# Patient Record
Sex: MALE | Race: WHITE | Employment: STUDENT | URBAN - METROPOLITAN AREA
[De-identification: names, ages, dates, MRNs, and addresses within clinical notes are randomized per-mention and may not be internally consistent; named-entity substitution may affect disease eponyms.]

---

## 2022-04-03 ENCOUNTER — HOSPITAL ENCOUNTER (INPATIENT)
Age: 22
LOS: 8 days | Discharge: HOME | DRG: 885 | End: 2022-04-12
Attending: PSYCHIATRY & NEUROLOGY | Admitting: PSYCHIATRY & NEUROLOGY
Payer: COMMERCIAL

## 2022-04-03 DIAGNOSIS — F29 PSYCHOSIS, UNSPECIFIED PSYCHOSIS TYPE (HCC): Primary | ICD-10-CM

## 2022-04-03 LAB
ACETAMINOPHEN LEVEL: <5 UG/ML (ref 10–30)
ALBUMIN SERPL-MCNC: 5.1 G/DL (ref 3.5–4.6)
ALP BLD-CCNC: 90 U/L (ref 35–104)
ALT SERPL-CCNC: 18 U/L (ref 0–41)
AMPHETAMINE SCREEN, URINE: NORMAL
ANION GAP SERPL CALCULATED.3IONS-SCNC: 17 MEQ/L (ref 9–15)
AST SERPL-CCNC: 35 U/L (ref 0–40)
BARBITURATE SCREEN URINE: NORMAL
BASOPHILS ABSOLUTE: 0.1 K/UL (ref 0–0.2)
BASOPHILS RELATIVE PERCENT: 0.9 %
BENZODIAZEPINE SCREEN, URINE: NORMAL
BILIRUB SERPL-MCNC: 0.7 MG/DL (ref 0.2–0.7)
BILIRUBIN URINE: NEGATIVE
BLOOD, URINE: NEGATIVE
BUN BLDV-MCNC: 4 MG/DL (ref 6–20)
CALCIUM SERPL-MCNC: 10.5 MG/DL (ref 8.5–9.9)
CANNABINOID SCREEN URINE: NORMAL
CHLORIDE BLD-SCNC: 91 MEQ/L (ref 95–107)
CHOLESTEROL, TOTAL: 182 MG/DL (ref 0–199)
CLARITY: CLEAR
CO2: 27 MEQ/L (ref 20–31)
COCAINE METABOLITE SCREEN URINE: NORMAL
COLOR: YELLOW
CREAT SERPL-MCNC: 0.71 MG/DL (ref 0.7–1.2)
EOSINOPHILS ABSOLUTE: 0 K/UL (ref 0–0.7)
EOSINOPHILS RELATIVE PERCENT: 0.4 %
ETHANOL PERCENT: NORMAL G/DL
ETHANOL: <10 MG/DL (ref 0–0.08)
GFR AFRICAN AMERICAN: >60
GFR NON-AFRICAN AMERICAN: >60
GLOBULIN: 2.6 G/DL (ref 2.3–3.5)
GLUCOSE BLD-MCNC: 95 MG/DL (ref 70–99)
GLUCOSE URINE: NEGATIVE MG/DL
HCT VFR BLD CALC: 45.2 % (ref 42–52)
HDLC SERPL-MCNC: 104 MG/DL (ref 40–59)
HEMOGLOBIN: 15.8 G/DL (ref 14–18)
KETONES, URINE: NEGATIVE MG/DL
LDL CHOLESTEROL CALCULATED: 65 MG/DL (ref 0–129)
LEUKOCYTE ESTERASE, URINE: NEGATIVE
LYMPHOCYTES ABSOLUTE: 0.7 K/UL (ref 1–4.8)
LYMPHOCYTES RELATIVE PERCENT: 12.5 %
Lab: NORMAL
MCH RBC QN AUTO: 29.1 PG (ref 27–31.3)
MCHC RBC AUTO-ENTMCNC: 34.9 % (ref 33–37)
MCV RBC AUTO: 83.2 FL (ref 80–100)
METHADONE SCREEN, URINE: NORMAL
MONOCYTES ABSOLUTE: 0.6 K/UL (ref 0.2–0.8)
MONOCYTES RELATIVE PERCENT: 11.4 %
NEUTROPHILS ABSOLUTE: 4.1 K/UL (ref 1.4–6.5)
NEUTROPHILS RELATIVE PERCENT: 74.8 %
NITRITE, URINE: NEGATIVE
OPIATE SCREEN URINE: NORMAL
OXYCODONE URINE: NORMAL
PDW BLD-RTO: 14 % (ref 11.5–14.5)
PH UA: 6.5 (ref 5–9)
PHENCYCLIDINE SCREEN URINE: NORMAL
PLATELET # BLD: 318 K/UL (ref 130–400)
POTASSIUM SERPL-SCNC: 3.2 MEQ/L (ref 3.4–4.9)
PROPOXYPHENE SCREEN: NORMAL
PROTEIN UA: NEGATIVE MG/DL
RBC # BLD: 5.43 M/UL (ref 4.7–6.1)
SALICYLATE, SERUM: <0.3 MG/DL (ref 15–30)
SARS-COV-2, NAAT: NOT DETECTED
SODIUM BLD-SCNC: 135 MEQ/L (ref 135–144)
SPECIFIC GRAVITY UA: 1 (ref 1–1.03)
TOTAL CK: 342 U/L (ref 0–190)
TOTAL PROTEIN: 7.7 G/DL (ref 6.3–8)
TRIGL SERPL-MCNC: 63 MG/DL (ref 0–150)
TSH SERPL DL<=0.05 MIU/L-ACNC: 2.59 UIU/ML (ref 0.44–3.86)
URINE REFLEX TO CULTURE: NORMAL
UROBILINOGEN, URINE: 0.2 E.U./DL
WBC # BLD: 5.5 K/UL (ref 4.8–10.8)

## 2022-04-03 PROCEDURE — 82077 ASSAY SPEC XCP UR&BREATH IA: CPT

## 2022-04-03 PROCEDURE — 96372 THER/PROPH/DIAG INJ SC/IM: CPT

## 2022-04-03 PROCEDURE — 36415 COLL VENOUS BLD VENIPUNCTURE: CPT

## 2022-04-03 PROCEDURE — 82550 ASSAY OF CK (CPK): CPT

## 2022-04-03 PROCEDURE — 80143 DRUG ASSAY ACETAMINOPHEN: CPT

## 2022-04-03 PROCEDURE — 6360000002 HC RX W HCPCS: Performed by: FAMILY MEDICINE

## 2022-04-03 PROCEDURE — 84443 ASSAY THYROID STIM HORMONE: CPT

## 2022-04-03 PROCEDURE — 80061 LIPID PANEL: CPT

## 2022-04-03 PROCEDURE — 6360000002 HC RX W HCPCS: Performed by: PHYSICIAN ASSISTANT

## 2022-04-03 PROCEDURE — 80053 COMPREHEN METABOLIC PANEL: CPT

## 2022-04-03 PROCEDURE — 80307 DRUG TEST PRSMV CHEM ANLYZR: CPT

## 2022-04-03 PROCEDURE — 85025 COMPLETE CBC W/AUTO DIFF WBC: CPT

## 2022-04-03 PROCEDURE — 99285 EMERGENCY DEPT VISIT HI MDM: CPT

## 2022-04-03 PROCEDURE — 81003 URINALYSIS AUTO W/O SCOPE: CPT

## 2022-04-03 PROCEDURE — 87635 SARS-COV-2 COVID-19 AMP PRB: CPT

## 2022-04-03 PROCEDURE — 2580000003 HC RX 258

## 2022-04-03 PROCEDURE — 80179 DRUG ASSAY SALICYLATE: CPT

## 2022-04-03 RX ORDER — LORAZEPAM 2 MG/ML
1 INJECTION INTRAMUSCULAR ONCE
Status: COMPLETED | OUTPATIENT
Start: 2022-04-03 | End: 2022-04-03

## 2022-04-03 RX ORDER — DIPHENHYDRAMINE HYDROCHLORIDE 50 MG/ML
50 INJECTION INTRAMUSCULAR; INTRAVENOUS ONCE
Status: COMPLETED | OUTPATIENT
Start: 2022-04-03 | End: 2022-04-03

## 2022-04-03 RX ORDER — ZIPRASIDONE MESYLATE 20 MG/ML
20 INJECTION, POWDER, LYOPHILIZED, FOR SOLUTION INTRAMUSCULAR ONCE
Status: COMPLETED | OUTPATIENT
Start: 2022-04-03 | End: 2022-04-03

## 2022-04-03 RX ADMIN — LORAZEPAM 1 MG: 2 INJECTION INTRAMUSCULAR; INTRAVENOUS at 19:17

## 2022-04-03 RX ADMIN — WATER 1.2 ML: 1 INJECTION INTRAMUSCULAR; INTRAVENOUS; SUBCUTANEOUS at 22:26

## 2022-04-03 RX ADMIN — ZIPRASIDONE MESYLATE 20 MG: 20 INJECTION, POWDER, LYOPHILIZED, FOR SOLUTION INTRAMUSCULAR at 22:24

## 2022-04-03 RX ADMIN — DIPHENHYDRAMINE HYDROCHLORIDE 50 MG: 50 INJECTION, SOLUTION INTRAMUSCULAR; INTRAVENOUS at 22:24

## 2022-04-03 NOTE — ED NOTES
Talked with  at [facility], talked with Patsy Ambrocio the officer who states he can only give me limited information but will have Betty Minors call the [phone] or [phone] number back with information regarding the pt, how long he has been having a hard time, his fathers name and phone number which he states he can not give me this information.   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

## 2022-04-03 NOTE — ED NOTES
Talked with [place] who called the -ER, a supervisor  He was going on an outing with other students and a student had concerns on the pt. The Protocol Counselor saw the pt and felt he needed to be seen at the hospital.  He was jumping off of bikes, pacing, and not able to answer any questions. Pt's father's name is Traci Lee at [phone].   The [facility] believes his erratic behavior started maybe Friday night or Saturday morning but not sure     Ancel Runner, RN  04/03/22 5011

## 2022-04-03 NOTE — ED PROVIDER NOTES
[address] [facility] ED  EMERGENCY DEPARTMENT ENCOUNTER      Pt Name: Kami Das  MRN: [id number]  [place] 2000  Date of evaluation: 4/3/2022  Provider: Amada Zamarripa DO    CHIEF COMPLAINT     No chief complaint on file. HISTORY OF PRESENT ILLNESS   (Location/Symptom, Timing/Onset, Context/Setting, Quality, Duration, Modifying Factors, Severity)  Note limiting factors. Kami Das is a 25 y.o. male who presents to the emergency department from [facility]. Patient states that he is here because \"they called\" though he cannot tell me who \"they\" are. Patient appears disoriented though he does know that he is at the hospital, his full name, date of birth, age but is impaired to situational awareness. Given this, he is unable to provide any further insight into the nature of his illness but he does deny any suicidal or homicidal ideation, auditory visual hallucinations    HPI    Nursing Notes were reviewed. REVIEW OF SYSTEMS    (2-9 systems for level 4, 10 or more for level 5)     Review of Systems   Unable to perform ROS: Psychiatric disorder       Except as noted above the remainder of the review of systems was reviewed and negative. PAST MEDICAL HISTORY   No past medical history on file. SURGICAL HISTORY     No past surgical history on file. CURRENT MEDICATIONS       Previous Medications    No medications on file       ALLERGIES     Patient has no known allergies. FAMILY HISTORY     No family history on file.        SOCIAL HISTORY       Social History     Socioeconomic History    Marital status: Not on file     Spouse name: Not on file    Number of children: Not on file    Years of education: Not on file    Highest education level: Not on file   Occupational History    Not on file   Tobacco Use    Smoking status: Not on file    Smokeless tobacco: Not on file   Substance and Sexual Activity    Alcohol use: Not on file    Drug use: Not on file    Sexual activity: Not on file   Other Topics Concern    Not on file   Social History Narrative    Not on file     Social Determinants of Health     Financial Resource Strain:     Difficulty of Paying Living Expenses: Not on file   Food Insecurity:     Worried About Running Out of Food in the Last Year: Not on file    Audrey of Food in the Last Year: Not on file   Transportation Needs:     Lack of Transportation (Medical): Not on file    Lack of Transportation (Non-Medical): Not on file   Physical Activity:     Days of Exercise per Week: Not on file    Minutes of Exercise per Session: Not on file   Stress:     Feeling of Stress : Not on file   Social Connections:     Frequency of Communication with Friends and Family: Not on file    Frequency of Social Gatherings with Friends and Family: Not on file    Attends Rastafarian Services: Not on file    Active Member of [address] [place] [address] [employer] or Organizations: Not on file    Attends Club or Organization Meetings: Not on file    Marital Status: Not on file   Intimate Partner Violence:     Fear of Current or Ex-Partner: Not on file    Emotionally Abused: Not on file    Physically Abused: Not on file    Sexually Abused: Not on file   Housing Stability:     Unable to Pay for Housing in the Last Year: Not on file    Number of [place] in the Last Year: Not on file    Unstable Housing in the Last Year: Not on file       SCREENINGS                        PHYSICAL EXAM    (up to 7 for level 4, 8 or more for level 5)     ED Triage Vitals   BP Temp Temp src Pulse Resp SpO2 Height Weight   -- -- -- -- -- -- -- --       Physical Exam  Vitals and nursing note reviewed. Constitutional:       General: He is not in acute distress. Appearance: He is well-developed. He is not diaphoretic. HENT:      Head: Normocephalic and atraumatic. Mouth/Throat:      Pharynx: No oropharyngeal exudate. Eyes:      General: No scleral icterus.      Conjunctiva/sclera: Conjunctivae normal. Pupils: Pupils are equal, round, and reactive to light. Neck:      Trachea: No tracheal deviation. Cardiovascular:      Rate and Rhythm: Normal rate. Heart sounds: Normal heart sounds. Pulmonary:      Effort: Pulmonary effort is normal. No respiratory distress. Breath sounds: Normal breath sounds. Abdominal:      General: Bowel sounds are normal. There is no distension. Palpations: Abdomen is soft. Musculoskeletal:         General: Normal range of motion. Cervical back: Normal range of motion and neck supple. Skin:     General: Skin is warm and dry. Findings: No erythema or rash. Neurological:      Mental Status: He is alert. He is disoriented. Cranial Nerves: No cranial nerve deficit. Motor: No abnormal muscle tone. Psychiatric:         Attention and Perception: He is inattentive. Speech: Speech is delayed. Cognition and Memory: Memory is impaired. DIAGNOSTIC RESULTS     EKG: All EKG's are interpreted by the Emergency Department Physician who either signs or Co-signs this chart in the absence of a cardiologist.      RADIOLOGY:   Non-plain film images such as CT, Ultrasound and MRI are read by the radiologist. Plain radiographic images are visualized and preliminarily interpreted by the emergency physician with the below findings:        Interpretation per the Radiologist below, if available at the time of this note:    No orders to display         ED BEDSIDE ULTRASOUND:   Performed by ED Physician - none    LABS:  Labs Reviewed   ACETAMINOPHEN LEVEL - Abnormal; Notable for the following components:       Result Value    Acetaminophen Level <5 (*)     All other components within normal limits   CBC WITH AUTO DIFFERENTIAL - Abnormal; Notable for the following components:    Lymphocytes Absolute 0.7 (*)     All other components within normal limits   CK - Abnormal; Notable for the following components:     Total  (*)     All other components within normal limits   COMPREHENSIVE METABOLIC PANEL - Abnormal; Notable for the following components:    Potassium 3.2 (*)     Chloride 91 (*)     Anion Gap 17 (*)     BUN 4 (*)     Calcium 10.5 (*)     Albumin 5.1 (*)     All other components within normal limits   LIPID PANEL - Abnormal; Notable for the following components:     (*)     All other components within normal limits   SALICYLATE LEVEL - Abnormal; Notable for the following components:    Salicylate, Serum <0.8 (*)     All other components within normal limits   COVID-19, RAPID   ETHANOL   TSH   URINE DRUG SCREEN   URINALYSIS WITH REFLEX TO CULTURE       All other labs were within normal range or not returned as of this dictation. EMERGENCY DEPARTMENT COURSE and DIFFERENTIAL DIAGNOSIS/MDM:   Vitals:    Vitals:    04/03/22 1630 04/03/22 2215   BP: 127/89 113/80   Pulse: 85 85   Resp: 18 18   Temp: 98.9 °F (37.2 °C)    TempSrc: Oral    SpO2: 98% 95%       Medically clear for BH. MDM      REASSESSMENT      Patient is medically cleared for psychiatric evaluation and care    CONSULTS:  None    PROCEDURES:  Unless otherwise noted below, none     Procedures    \    FINAL IMPRESSION      1. Psychosis, unspecified psychosis type ([facility] [address]          DISPOSITION/PLAN   DISPOSITION  3 [facility]      PATIENT REFERRED TO:  No follow-up provider specified. DISCHARGE MEDICATIONS:  New Prescriptions    No medications on file     Controlled Substances Monitoring:     No flowsheet data found.     (Please note that portions of this note were completed with a voice recognition program.  Efforts were made to edit the dictations but occasionally words are mis-transcribed.)    Annette Lea DO (electronically signed)  Attending Emergency Physician            Annette Lea DO  04/04/22 4935

## 2022-04-03 NOTE — ED NOTES
Pt came in directly from [facility] came back as triage was busy. Pt is almost non verbal and wants to leave. Called security and [employer] is here assessing the pt for a medical assessment. Pt is disorganized, confused, delayed answering, thought blocking. Security was here with the pt. Called [facility] and they did not bring the pt here to the [facility] and he will have the squad call to the ER, [phone] number given.      Eden Escobedo RN  04/03/22 4845

## 2022-04-03 NOTE — ED NOTES
Talked with [facility] and they state pt was picked up at the college after his father talked with the college and he was not doing well is not his normal self. No name or number for the father. Asked if they had the number for the campus police. Zoloft was on his counter in his room not brought with him no other information was known by the [employer].      Lisa Green RN  04/03/22 9920

## 2022-04-04 PROBLEM — F29 PSYCHOSIS (HCC): Status: ACTIVE | Noted: 2022-04-04

## 2022-04-04 PROCEDURE — 2580000003 HC RX 258

## 2022-04-04 PROCEDURE — 2500000003 HC RX 250 WO HCPCS: Performed by: PHYSICIAN ASSISTANT

## 2022-04-04 PROCEDURE — 1240000000 HC EMOTIONAL WELLNESS R&B

## 2022-04-04 PROCEDURE — 99223 1ST HOSP IP/OBS HIGH 75: CPT | Performed by: PSYCHIATRY & NEUROLOGY

## 2022-04-04 PROCEDURE — 6370000000 HC RX 637 (ALT 250 FOR IP): Performed by: PSYCHIATRY & NEUROLOGY

## 2022-04-04 PROCEDURE — 96372 THER/PROPH/DIAG INJ SC/IM: CPT

## 2022-04-04 RX ORDER — CLONAZEPAM 0.5 MG/1
0.25 TABLET ORAL 2 TIMES DAILY PRN
Status: ON HOLD | COMMUNITY
End: 2022-04-12 | Stop reason: HOSPADM

## 2022-04-04 RX ORDER — VALPROIC ACID 250 MG/5ML
500 SOLUTION ORAL 2 TIMES DAILY
Status: DISCONTINUED | OUTPATIENT
Start: 2022-04-04 | End: 2022-04-12 | Stop reason: HOSPADM

## 2022-04-04 RX ORDER — HYDROXYZINE HYDROCHLORIDE 50 MG/ML
50 INJECTION, SOLUTION INTRAMUSCULAR EVERY 6 HOURS PRN
Status: DISCONTINUED | OUTPATIENT
Start: 2022-04-04 | End: 2022-04-12 | Stop reason: HOSPADM

## 2022-04-04 RX ORDER — LORAZEPAM 2 MG/ML
2 INJECTION INTRAMUSCULAR ONCE
Status: DISCONTINUED | OUTPATIENT
Start: 2022-04-04 | End: 2022-04-04

## 2022-04-04 RX ORDER — OLANZAPINE 10 MG/1
10 INJECTION, POWDER, LYOPHILIZED, FOR SOLUTION INTRAMUSCULAR
Status: COMPLETED | OUTPATIENT
Start: 2022-04-04 | End: 2022-04-04

## 2022-04-04 RX ORDER — PALIPERIDONE 3 MG/1
3 TABLET, EXTENDED RELEASE ORAL DAILY
Status: DISCONTINUED | OUTPATIENT
Start: 2022-04-04 | End: 2022-04-05

## 2022-04-04 RX ORDER — HYDROXYZINE PAMOATE 50 MG/1
50 CAPSULE ORAL EVERY 6 HOURS PRN
Status: DISCONTINUED | OUTPATIENT
Start: 2022-04-04 | End: 2022-04-12 | Stop reason: HOSPADM

## 2022-04-04 RX ORDER — ACETAMINOPHEN 325 MG/1
650 TABLET ORAL EVERY 4 HOURS PRN
Status: DISCONTINUED | OUTPATIENT
Start: 2022-04-04 | End: 2022-04-12 | Stop reason: HOSPADM

## 2022-04-04 RX ORDER — HALOPERIDOL 5 MG
5 TABLET ORAL EVERY 6 HOURS PRN
Status: DISCONTINUED | OUTPATIENT
Start: 2022-04-04 | End: 2022-04-12 | Stop reason: HOSPADM

## 2022-04-04 RX ORDER — TRAZODONE HYDROCHLORIDE 50 MG/1
50 TABLET ORAL NIGHTLY PRN
Status: DISCONTINUED | OUTPATIENT
Start: 2022-04-04 | End: 2022-04-12 | Stop reason: HOSPADM

## 2022-04-04 RX ORDER — BENZTROPINE MESYLATE 1 MG/ML
2 INJECTION INTRAMUSCULAR; INTRAVENOUS 2 TIMES DAILY PRN
Status: DISCONTINUED | OUTPATIENT
Start: 2022-04-04 | End: 2022-04-12 | Stop reason: HOSPADM

## 2022-04-04 RX ORDER — HALOPERIDOL 5 MG/ML
5 INJECTION INTRAMUSCULAR EVERY 6 HOURS PRN
Status: DISCONTINUED | OUTPATIENT
Start: 2022-04-04 | End: 2022-04-12 | Stop reason: HOSPADM

## 2022-04-04 RX ADMIN — PALIPERIDONE 3 MG: 3 TABLET, EXTENDED RELEASE ORAL at 17:37

## 2022-04-04 RX ADMIN — HALOPERIDOL 5 MG: 5 TABLET ORAL at 23:45

## 2022-04-04 RX ADMIN — HYDROXYZINE PAMOATE 50 MG: 50 CAPSULE ORAL at 12:30

## 2022-04-04 RX ADMIN — VALPROIC ACID 500 MG: 250 SOLUTION ORAL at 17:37

## 2022-04-04 RX ADMIN — HYDROXYZINE PAMOATE 50 MG: 50 CAPSULE ORAL at 23:45

## 2022-04-04 RX ADMIN — OLANZAPINE 10 MG: 10 INJECTION, POWDER, FOR SOLUTION INTRAMUSCULAR at 08:24

## 2022-04-04 RX ADMIN — TRAZODONE HYDROCHLORIDE 50 MG: 50 TABLET ORAL at 21:39

## 2022-04-04 RX ADMIN — WATER 2.1 ML: 1 INJECTION INTRAMUSCULAR; INTRAVENOUS; SUBCUTANEOUS at 08:24

## 2022-04-04 ASSESSMENT — SLEEP AND FATIGUE QUESTIONNAIRES
DIFFICULTY ARISING: NO
DIFFICULTY FALLING ASLEEP: NO
AVERAGE NUMBER OF SLEEP HOURS: 2
DIFFICULTY STAYING ASLEEP: YES
DO YOU USE A SLEEP AID: NO
SLEEP PATTERN: DIFFICULTY FALLING ASLEEP;DISTURBED/INTERRUPTED SLEEP;INSOMNIA
RESTFUL SLEEP: NO
DO YOU HAVE DIFFICULTY SLEEPING: YES

## 2022-04-04 NOTE — ED NOTES
Per Dr Mini Gaines, admit to 3w with 1:1. Psychosis NOS, 3w PRNS. 3w informed, staffing called and notified of need for 1:1, and Dr Antwan Rodrigez given dispo.      Eliezer Roberto RN  04/04/22 5819

## 2022-04-04 NOTE — PROGRESS NOTES
Report received from [facility]  Patient presents to the ED from [facility]. Upon arrival patient was non-verbal. Patient then talked in triage but was disorganized, confused, delayed and thought blocking. Patient was pleasant during assessment but unable to answer questions appropriately when asked. Patient talks in circles and does not answer asked questions. Patient is discharge focused, wanting to leave by 8am for a program at [facility]. Patient is Patient denies SI, HI, AVH. Patient is paranoid of staff and medications administered to him. When asked about alcohol and illegal drugs he states, \"I look like a stoner don't I\". Denies ETOH use. Patient displays very high energy and difficulty following redirection. He is coming up with 1:1.

## 2022-04-04 NOTE — ED NOTES
Patient displaying bizarre, excitable behavior including running in place in the bathroom. He was agreeable with taking medication and states he would like help calming down. IM Zyprexa administered per eMAR. Will continue to monitor.       Leah Valiente RN  04/04/22 0956

## 2022-04-04 NOTE — ED NOTES
Patient ambulated to bathroom with no issues. Patient returned back to assigned area. Demonstrates an understanding of teaching. Patient instructed to rest in bed and use call light if he needs assistance getting up after being medicated.      Amish Guzman RN  04/03/22 6116

## 2022-04-04 NOTE — ED NOTES
Patient states he has not been sleeping in several days his speech is not clear unable to complete an assessment at this time.       Curtis Marquis RN  04/04/22 8999

## 2022-04-04 NOTE — ED NOTES
Per Dr. Romayne Mylar continue to monitor patient in the ER and see if Dr. Tiffanie Carrillo wants to admit to [facility] [address] if patient clears in the A.M.      Sherin Wu RN  04/04/22 1520

## 2022-04-04 NOTE — H&P
[address] - Department of Psychiatry    History and Physical - Adult         CHIEF COMPLAINT:  Manic Psychosis    History obtained from:  patient    Patient was seen after discussing with the treatment team and reviewing the chart      HISTORY OF PRESENT ILLNESS:      The patient is a 25 y.o. male with no significant past history of mental illness    ER report:    Patient presents to the ED from [facility]. Upon arrival patient was non-verbal. Patient then talked in triage but was disorganized, confused, delayed and thought blocking. Patient was pleasant during assessment but unable to answer questions appropriately when asked. Patient talks in circles and does not answer asked questions. Patient is discharge focused, wanting to leave by 8am for a program at [facility]. Patient is Patient denies SI, HI, AVH. Patient is paranoid of staff and medications administered to him. When asked about alcohol and illegal drugs he states, \"I look like a stoner don't I\". Denies ETOH use. During interview:    Pt appeared disheveled, manic and psychotic with disorganized thinking  Pt present with grandiose thinking with poor insight and Judgment  Unable to get any coherent account from patient  Demanding discharge from hospital    Received phone call from [employer] at [facility]. No LUANA signed so no information was given. Did not confirm or deny pt's presence at hospital. [employer] wanted to provide information. No informed was given to [facility].      Marci [phone]/ [facility], [employer] programming which is a clearing house for reports made by students and faculty regarding students mental health concerns.      Reports only receiving less than 5 \"share reports\" which is an incident report that is submited via online system.  Reports these were completed by 3 friends, 1 friend who did a walk in and 1 was completed by \"[employer]\" reports pt was not making sense so he was not able to go due to pt not making sense. Reports that he left her a message and he \"made no sense. \" Reports pt was not eating. Reports pt was eating out of garbage cans. Reports pt has swayed on his feet due to not eating. Reports that pt would constantly say he is fat. Reports that pt was on a leave for an entire year/2 semester medical leave. Unsure if pt received treatment for eating disorder at this time. Reports pt meets with psychoanalyst and this is his support system. Reports this therapy is very \"freudian\" and not \"modern day therapy. \" reports she received text from a friend and everyone is concerned about him and pt not eating. Reports faculty did say he was going to class and participating. Reports pt has been struggling with his behaviors in private. The patient is not currently receiving care for the above psychiatric illness. Medications Prior to Admission:   Medications Prior to Admission: sertraline (ZOLOFT) 50 MG tablet, Take 75 mg by mouth daily Dose just changed from 50 mg to 75 mg, patient did not  yet. ([facility]). clonazePAM (KLONOPIN) 0.5 MG tablet, Take 0.25 mg by mouth 2 times daily as needed. Patient did not  from pharmacy yet ([facility])    Compliance:no    Psychiatric Review of Systems       Depression: no     Elizabeth or Hypomania:  yes      Panic Attacks:  no     Phobias:  no     Obsessions and Compulsions:  no     PTSD : no     Hallucinations:  yes      Delusions:  yes     Substance Abuse History:  Unable to elicit details  Utox negative     Past Psychiatric History:  Denies any past illness    Past Medical History:    No past medical history on file. Past Surgical History:    No past surgical history on file. Allergies:   Patient has no known allergies. Family History  No family history on file. 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 Explain positive(checked items) below:  [] Constitutional  [] Eyes  [] Ear/Nose/Mouth/Throat  [] Respiratory  [] CV  [] GI  []   [] Musculoskeletal  [] Skin/Breast  [] Neurological  [] Endocrine  [] Heme/Lymph  [] Allergic/Immunologic    Explanation:     Vitals:  /88   Pulse 98   Temp 97.7 °F (36.5 °C) (Oral)   Resp 22   SpO2 96%      Neurologic Exam:   Muscle Strength & Tone: full ROM  Gait: normal gait   Involuntary Movements: No    Mental Status Examination:    Level of consciousness:  within normal limits   Appearance:  hospital attire  Behavior/Motor:  psychomotor agitation  Attitude toward examiner:  Not cooperative  Speech:  rapid and hyperverbal   Mood: irritable and labile  Affect:  mood incongruent  Thought processes:  flight of ideas   Thought content:  Delusions:  grandiose  Perceptual Disturbance:  auditory  Cognition:  oriented to person, place, and time   Concentration poor  Memory intact  Insight poor   Judgement poor   Fund of Knowledge limited    Mini Mental Status not completed      DIAGNOSIS:     Bipolar 1 manic severe with psychosis          RISK ASSESSMENT:    SUICIDE RISK ASSESSMENT: high risk of impulivity  HOMICIDE: low  AGITATION/VIOLENCE: high  ELOPEMENT: high    LABS: REVIEWED TODAY:  Recent Labs     04/03/22  1645   WBC 5.5   HGB 15.8        Recent Labs     04/03/22  1645      K 3.2*   CL 91*   CO2 27   BUN 4*   CREATININE 0.71   GLUCOSE 95     Recent Labs     04/03/22  1645   BILITOT 0.7   ALKPHOS 90   AST 35   ALT 18     Lab Results   Component Value Date    LABAMPH Neg 04/03/2022    BARBSCNU Neg 04/03/2022    LABBENZ Neg 04/03/2022    LABMETH Neg 04/03/2022    OPIATESCREENURINE Neg 04/03/2022    PHENCYCLIDINESCREENURINE Neg 04/03/2022    ETOH <10 04/03/2022     Lab Results   Component Value Date    TSH 2.590 04/03/2022     No results found for: LITHIUM  No results found for: VALPROATE, CBMZ  No results found for: LITHIUM, VALPROATE    FURTHER LABS ORDERED :      Radiology   No results found. EKG: TRACING REVIEWED    TREATMENT PLAN:    Risk Management:  suicide risk and homocidal risk    Collateral Information:  Will obtain collateral information from the family or friends. Will obtain medical records as appropriate from out patient providers  Will consult the hospitalist for a physical exam to rule out any co-morbid physical condition. Home medication Reconciled       New Medications started during this admission :    See orders  Prn Haldol 5mg and Vistaril 50mg q6hr for extreme agitation. Trazodone as ordered for insomnia  Vistaril as ordered for anxiety  Discussed with the patient risk, benefit, alternative and common side effects for the  proposed medication treatment. Patient is consenting to the treatment.     Psychotherapy:   Encourage participation in milieu and group therapy  Individual therapy as needed      Electronically signed by Tasneem Moncada MD on 4/4/2022 at 5:24 PM

## 2022-04-04 NOTE — ED NOTES
Patient resting in bed.   Patient discharge focused and requesting that he get back to [place] by Zak Mora RN  04/04/22 2343

## 2022-04-04 NOTE — ED NOTES
Patient is not in control of his behavior. Patient is thrashing around his bed, attempting to take off his restraints and is successful with the foot restraint. [facility] called and assisted this RN with fasting the restraint. Patient does not meet criteria for the removal of restraints. Patient unable to contract for safety, patient poses a safety risk to others as well as himself. Patient does not demonstrate an understanding of required behavior for discontinuation of restraints. Skin is pink, warm, dry and intact. No harm noted from restraints. Patient able to perform ROM.      Cyndee Rodriguez RN  04/03/22 2030

## 2022-04-04 NOTE — ED NOTES
Patient is observed by this RN to attempt to remove L wrist restraint with this R hand. Patient continues to show that he is not in control of his behavior. Patient continues to talk to other patients across the unit and engage in conversation asking them to help him remove restraints.       Tylor Ulloa RN  04/03/22 8562

## 2022-04-04 NOTE — PROGRESS NOTES
Pt. presents pleasant. Confused at times and thought blocking. Stares blankly at times. Very eager and hyper at times. Vague with any details of admission. Has friends and supportive family. Unsure if he is in a relationship. 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 Denies AH/VH but may be internally stimulated. Denies si/hi. \"I've had a tough finals week. \"  Denies drinking ETOH has smoked marijuana 1x and does not use any other drugs. Admits having a difficult time decision making.  Stressors include  1.tests and finals  2.when its really cold outdoors  *badminton, play frisbee, build things in the workshop, exercise  Electronically signed by Hope Farris on 4/4/2022 at 12:20 PM

## 2022-04-04 NOTE — CARE COORDINATION
Received phone call from [facility]. No LUANA signed so no information was given. Did not confirm or deny pt's presence at hospital. [facility] wanted to provide information. No informed was given to mariela. Mariela [phone]/ [facility], [employer] programming which is a clearing house for reports made by students and faculty regarding students mental health concerns. Reports only receiving less than 5 \"share reports\" which is an incident report that is submited via online system. Reports these were completed by 3 friends, 1 friend who did a walk in and 1 was completed by \"[employer]\" reports pt was not making sense so he was not able to go due to pt not making sense. Reports that he left her a message and he \"made no sense. \" Reports pt was not eating. Reports pt was eating out of garbage cans. Reports pt has swayed on his feet due to not eating. Reports that pt would constantly say he is fat. Reports that pt was on a leave for an entire year/2 semester medical leave. Unsure if pt received treatment for eating disorder at this time. Reports pt meets with psychoanalyst and this is his support system. Reports this therapy is very \"freudian\" and not \"modern day therapy. \" reports she received text from a friend and everyone is concerned about him and pt not eating. Reports faculty did say he was going to class and participating. Reports pt has been struggling with his behaviors in private.     Electronically signed by EMELINA Zavala on 4/4/2022 at 10:42 AM

## 2022-04-04 NOTE — CARE COORDINATION
Group Therapy Note    Date: 4/4/2022  Start Time:1310  End Time: 7633    Number of Participants:5    Type of Group: Cognitive Skills    Patient's Goal:  To participate in mood management group. Notes: Patient declined to attend psychoeducation group at 1310 despite encouragement by staff.      Discipline Responsible: /Counselor    MASTER Hernandez

## 2022-04-04 NOTE — PROGRESS NOTES
Patient arrived from ER  Skin and contraband check completed. Patient has a small scratch on right forearm healing well. No other observed skin issues. No Contraband.

## 2022-04-04 NOTE — CARE COORDINATION
4/4/2022 @ 80 -  attempted to assess patient. He was being medicated due to being extremely symptomatic. Nurse advised patient be assessed later. Patient refused to be assessed. As a result,  assessment was deferred.     Electronically signed by Marlene [address] on 4/4/2022 at 12:35 PM

## 2022-04-04 NOTE — PROGRESS NOTES
Admission  Patient is on 1:1. He is slender with minimal eye contact. At times he speaks with eyes closed and rolls his eyes upward. Patient moves rapidly frequently doing jumping jacks and physical exercises. He expressed taking care of himself being important to him. Patient appears to struggle slowing down to rest, but was able to do so for about 6-7 minutes, then resumed extreme movement. He denies hallucinations or delusions and none observed. His speech is garbled at times and other times audible and clear. It is rapid. He appears to have thought blocking and poor concentration. Patient is oriented to self but not to location, date, month or president of [place]. Judgement is impaired and concentration is very poor. Consents were deferred at this time. He denies SI now or ever. He is future oriented. Denies any H/O aggression or violence. Denies hallucinations. Is observed moving his lips with eyes closed with no one next to him. Extremely manic, with FOI and disorganized thoughts.

## 2022-04-04 NOTE — ED NOTES
Consulted with provider. Provider placed new medication orders due to patients unpredictable behaviors and safety. Patient is restless, continues to yell and attempt to remove restraints.      Sary Proctor RN  04/03/22 8028

## 2022-04-04 NOTE — PROGRESS NOTES
Pt is noted jumping up and down in his room, offered and gave vistaril 50 mg pt was agreeable, noted to have checked med trying to hide it, mouth checks done and no med seen.

## 2022-04-04 NOTE — ED NOTES
Patient calmer, now sitting in bed, appears slightly drowsy, but more directable and in control of behaviors.      Mary Pardo RN  04/04/22 9216

## 2022-04-04 NOTE — ED NOTES
Provisional Diagnosis:    Pyschotic    Psychosocial and Contextual Factors:    Patient is an [address] [place] [address] Student    C-SSRS Summary:     Patient: C-SSRS Suicide Screening  1) Within the past month, have you wished you were dead or wished you could go to sleep and not wake up? : No  2) Have you actually had any thoughts of killing yourself? : No  6) Have you ever done anything, started to do anything, or prepared to do anything to end your life?: No  Risk of Suicide: No Risk    Family: None at bedside    Agency: none          Abuse Assessment  Physical Abuse: Denies  Verbal Abuse: Denies  Emotional abuse: Denies  Financial Abuse: Denies  Sexual abuse: Denies    Clinical Summary:    Patient presents to the ED from [address] [place] [address]. Upon arrival patient was non-verbal. Patient then talked in triage but was disorganized, confused, delayed and thought blocking. Patient was pleasant during assessment but unable to answer questions appropriately when asked. Patient talks in circles and does not answer asked questions. Patient is discharge focused, wanting to leave by 8am for a program at [address] [place] [address]. Patient is Patient denies SI, HI, AVH. Patient is paranoid of staff and medications administered to him. When asked about alcohol and illegal drugs he states, \"I look like a stoner don't I\". Denies ETOH use.      Level of Care Disposition:      Per Dr Rhys Harrison, RN  04/04/22 8355

## 2022-04-04 NOTE — ED NOTES
Patient presentation has somewhat improved. Patient reported to [facility] that he had not slept in several days prior to sleeping today in the [facility].        Clarke Donald RN  04/04/22 [address], RN  04/04/22 7325

## 2022-04-04 NOTE — ED NOTES
postive affect of medications patient appears to be  sleeping respirations are even and unlabored no distress noted at this time.      Tiffany Peck RN  04/04/22 4580

## 2022-04-04 NOTE — ED NOTES
Patient medicated with Geodon and Benadryl per orders. Patient requested both injections in L Deltoid. Patient removed from restraints. Patient states an understanding of appropriate behaviors and states that he will maintain a safe environment for himself by staying in his assigned area. Patient remains discharge focused; poor insight to this admission.      Prakash Kyle RN  04/03/22 5447

## 2022-04-05 LAB — POTASSIUM SERPL-SCNC: 3.4 MEQ/L (ref 3.4–4.9)

## 2022-04-05 PROCEDURE — 6360000002 HC RX W HCPCS: Performed by: PSYCHIATRY & NEUROLOGY

## 2022-04-05 PROCEDURE — 6370000000 HC RX 637 (ALT 250 FOR IP): Performed by: PSYCHIATRY & NEUROLOGY

## 2022-04-05 PROCEDURE — 1240000000 HC EMOTIONAL WELLNESS R&B

## 2022-04-05 PROCEDURE — 84132 ASSAY OF SERUM POTASSIUM: CPT

## 2022-04-05 PROCEDURE — 36415 COLL VENOUS BLD VENIPUNCTURE: CPT

## 2022-04-05 PROCEDURE — 99233 SBSQ HOSP IP/OBS HIGH 50: CPT | Performed by: PSYCHIATRY & NEUROLOGY

## 2022-04-05 PROCEDURE — 2580000003 HC RX 258

## 2022-04-05 PROCEDURE — 93005 ELECTROCARDIOGRAM TRACING: CPT | Performed by: PSYCHIATRY & NEUROLOGY

## 2022-04-05 PROCEDURE — 6360000002 HC RX W HCPCS

## 2022-04-05 RX ORDER — PALIPERIDONE 6 MG/1
6 TABLET, EXTENDED RELEASE ORAL DAILY
Status: DISCONTINUED | OUTPATIENT
Start: 2022-04-06 | End: 2022-04-12 | Stop reason: HOSPADM

## 2022-04-05 RX ORDER — ZIPRASIDONE MESYLATE 20 MG/ML
20 INJECTION, POWDER, LYOPHILIZED, FOR SOLUTION INTRAMUSCULAR ONCE
Status: COMPLETED | OUTPATIENT
Start: 2022-04-05 | End: 2022-04-05

## 2022-04-05 RX ORDER — LORAZEPAM 2 MG/ML
INJECTION INTRAMUSCULAR
Status: COMPLETED
Start: 2022-04-05 | End: 2022-04-05

## 2022-04-05 RX ORDER — ZIPRASIDONE MESYLATE 20 MG/ML
INJECTION, POWDER, LYOPHILIZED, FOR SOLUTION INTRAMUSCULAR
Status: COMPLETED
Start: 2022-04-05 | End: 2022-04-05

## 2022-04-05 RX ORDER — LORAZEPAM 2 MG/ML
1 INJECTION INTRAMUSCULAR EVERY 6 HOURS PRN
Status: DISCONTINUED | OUTPATIENT
Start: 2022-04-05 | End: 2022-04-12 | Stop reason: HOSPADM

## 2022-04-05 RX ORDER — LORAZEPAM 2 MG/ML
2 INJECTION INTRAMUSCULAR ONCE
Status: COMPLETED | OUTPATIENT
Start: 2022-04-05 | End: 2022-04-05

## 2022-04-05 RX ADMIN — LORAZEPAM 2 MG: 2 INJECTION INTRAMUSCULAR; INTRAVENOUS at 08:00

## 2022-04-05 RX ADMIN — VALPROIC ACID 500 MG: 250 SOLUTION ORAL at 21:12

## 2022-04-05 RX ADMIN — WATER 10 ML: 1 INJECTION INTRAMUSCULAR; INTRAVENOUS; SUBCUTANEOUS at 14:23

## 2022-04-05 RX ADMIN — ZIPRASIDONE MESYLATE 20 MG: 20 INJECTION, POWDER, LYOPHILIZED, FOR SOLUTION INTRAMUSCULAR at 08:01

## 2022-04-05 RX ADMIN — BENZTROPINE MESYLATE 2 MG: 1 INJECTION INTRAMUSCULAR; INTRAVENOUS at 13:54

## 2022-04-05 RX ADMIN — LORAZEPAM 2 MG: 2 INJECTION INTRAMUSCULAR at 08:00

## 2022-04-05 NOTE — PROGRESS NOTES
Behavioral Services  Medicare Certification Upon Admission    I certify that this patient's inpatient psychiatric hospital admission is medically necessary for:    [x] (1) Treatment which could reasonably be expected to improve this patient's condition,       [x] (2) Or for diagnostic study;     AND     [x](2) The inpatient psychiatric services are provided while the individual is under the care of a physician and are included in the individualized plan of care.     Estimated length of stay/service 3-5 days    Plan for post-hospital care OP care    Electronically signed by Sea Viramontes MD on 4/5/2022 at 3:35 PM

## 2022-04-05 NOTE — PROGRESS NOTES
Patient did not attend group despite staff encouragement.   Electronically signed by Marc Phelan on 4/4/2022 at 10:10 PM

## 2022-04-05 NOTE — PROGRESS NOTES
1:1 care assumed at 1515. Patient was jumping up and down for an hour before talking to this tech. Patient unable to hold a meaningful conversation. Patient out to use the phone and was overheard talking about his \"little guides\" who are in his \"subconscious\" but he can hear. These \"guides\" gave him \"three life paths\" and he chose one. Patient ate 2 bites of his burger for dinner. Patient difficult to redirect and does not like to hear no. Patient frequently asking to talk to members of staff to assess him so he can leave tonight. Patient is able to sit still for small periods of time, but will jump right back up and start asking for the same staff members, despite beng told he will not be going home tonight.

## 2022-04-05 NOTE — PROGRESS NOTES
Patient remains on 1:1. He had a very difficult AM until IM medication. He is repetative about needing help to leave because he has \"important work to do to make the world a better place\". Patient is unable to be redirected. Behavior is intense yet polite but violating body space of others and often requesting to hold staff hands. He is only oriented to person. Judgement is poor and patient initiated eating banana peel and all. He is intrusive toward other peers. Denies SI, HI or hallucinations. At times eyes are closed and patient appears to be talking to himself. Eyelids look very heavy.

## 2022-04-05 NOTE — CARE COORDINATION
Psychosocial Assessment    Current Level of Psychosocial Functioning     Independent X  Dependent    Minimal Assist     Comments:  Attends [employer]. RENALDO. Manic/psychotic     Psychosocial High Risk Factors (check all that apply)    Unable to obtain meds   Chronic illness/pain    Substance abuse   Lack of Family Support   Financial stress   Isolation   Inadequate Community Resources X   Suicide attempt(s)  Not taking medications  ? Victim of crime   Developmental Delay  Unable to manage personal needs    Age 73 or older   Homeless  No transportation  ? Readmission within 30 days  Unemployment ? Traumatic Event    Family/Supports identified: will need to be reapproached regarding collateral. Pt is highly symptomatic and unable to participate in assessment process. Sexual Orientation:  Did not disclose     Patient Strengths: enrolled in college, able to make needs known. Patient Barriers: symptoms, isolation     Safety plan: needs to be completed due to level of symptom severity    CMHC/MH history: reported to RN he had a past hospitalization. [facility] reports 2 semester medical leave in the past.     Plan of Care:  medication management, group/individual therapies, family meetings, psycho -education, treatment team meetings to assist with stabilization    Initial Discharge Plan:  Gather collateral and continue to reassess pt. Clinical Summary:  Pt is highly symptomatic. Pt is 25year old male who presented to ER due to being disoriented, psychotic, manic. Unable to participate in assessment process. Pt is disorganized, confused, demanding discharge. Chart indicates that his father spoke with college. But no information given for father. Pt is highly symptomatic and unable to engage. Denies SI/HI/AVH. discharg focused. Code was called previously this morning.  Electronically signed by EMELINA Miguel on 4/5/2022 at 9:56 AM

## 2022-04-05 NOTE — CARE COORDINATION
Brief Intervention and Referral to Treatment Summary    Patient was provided PHQ-9, AUDIT and DAST Screening:      PHQ-9 Score: NC   AUDIT Score: RENALDO     DAST Score:  RENALDO     Patients substance use is considered RENALDO    Low Risk/Healthy  Moderate Risk  Harmful  Dependent    Patients depression is considered:  NC     Minimal  Mild   Moderate  Moderately Severe  Severe    Brief Education Was Provided n/a     Patient was receptive  Patient was not receptive      Brief Intervention Is Provided (Only for AUDIT or DAST)  N/a     Patient reports readiness to decrease and/or stop use and a plan was discussed   Patient denies readiness to decrease and/or stop use and a plan was not discussed      Recommendations/Referrals for Brief and/or Specialized Treatment Provided to Patient     Pt unable to participate in assessment process due to symptom severity. Pt did report to RN that he used shrooms recently. UDS and ETOH is negative.  Reports having a psychoanalyst. Electronically signed by EMELINA Marques on 4/5/2022 at 9:58 AM

## 2022-04-05 NOTE — FLOWSHEET NOTE
Dr. Agnes Cedeno called to talk with Dr. Lynn Hayes in regards to this patient. Gave cell phone number [phone]. Says she sees this patient as an outpatient. Dr. Agnes Cedeno notified that we do not have consent to release information at this time.  Verbalized understanding and asked that we communicate with her or his therapist Luevenia Favre.

## 2022-04-05 NOTE — PROGRESS NOTES
1:1 sitter at bedside maintained. Pt difficult to redirect, asked to lay down in bed to assist with HS medications working.

## 2022-04-05 NOTE — PROGRESS NOTES
Pt. did not attend the 0900 community meeting due to having intrusive behavior. Patient is now resting in his room.  Electronically signed by Killian Alfaro, [address] on 4/5/2022 at 9:40 AM

## 2022-04-05 NOTE — PROGRESS NOTES
This nurse sitting with patient for 1:1. Patient is extremely focused on talking to an employee who came up here from another floor. He is convinced that the employee will be able to assess him and send him home tonight. Patient is not redirectable and will not take no for an answer. Pt continues to beg this nurse to speak to the other employee and at one point got on his knees begging this nurse to speak to her. Pt began doing jumping jacks very quickly and was more of just jumping up and down in place while tapping hands quickly on his thighs with eyes closed. Pt did this for about 12 minutes and then laid down in bed with hands shaking while holding each index fingertip to the tip of each thumb. Pt spoke almost in a euphoric tone like runners high and then asked if he \"could just chill for a minute. \"  Pt remains disorganized and cannot answer any questions as to what happened that brought him to the hospital.

## 2022-04-05 NOTE — PROGRESS NOTES
Patient did not attend group despite staff encouragement.   Electronically signed by Faisal Temple on 4/4/2022 at 8:25 PM

## 2022-04-05 NOTE — PROGRESS NOTES
[facility] FOLLOW-UP NOTE       4/5/2022     Patient was seen and examined in person, Chart reviewed   Patient's case discussed with staff/team    Chief Complaint: Elizabeth, psychosis    Interim History:     Pt continue to remain agitated, intrusive and hyperactive, difficult to redirect, on 1 to 1 monitor  Entering other patients room  Constantly demanding discharge  Pt is grandiose thinking that he has an important mission to save the world and that he has to leave immediately  Behavior has been bizarre  Poor insight  [facility] was called this morning and needed PRN medication  EKG- not done, pt refusing  Appetite:   [] Normal/Unchanged  [] Increased  [x] Decreased      Sleep:       [] Normal/Unchanged  [] Fair       [x] Poor              Energy:    [] Normal/Unchanged  [x] Increased  [] Decreased        SI [] Present  [] Absent    HI  []Present  [] Absent     Aggression:  [x] yes  [] no    Patient is [] able  [x] unable to CONTRACT FOR SAFETY     PAST MEDICAL/PSYCHIATRIC HISTORY:   No past medical history on file. FAMILY/SOCIAL HISTORY:  No family history on file.   Social History     Socioeconomic History    Marital status: Single     Spouse name: Not on file    Number of children: Not on file    Years of education: Not on file    Highest education level: Not on file   Occupational History    Not on file   Tobacco Use    Smoking status: Not on file    Smokeless tobacco: Not on file   Substance and Sexual Activity    Alcohol use: Not on file    Drug use: Not on file    Sexual activity: Not on file   Other Topics Concern    Not on file   Social History Narrative    Not on file     Social Determinants of Health     Financial Resource Strain:     Difficulty of Paying Living Expenses: Not on file   Food Insecurity:     Worried About Running Out of Food in the Last Year: Not on file    Audrey of Food in the Last Year: Not on file   Transportation Needs:     Lack of Transportation (Medical): Not on file    Lack of Transportation (Non-Medical): Not on file   Physical Activity:     Days of Exercise per Week: Not on file    Minutes of Exercise per Session: Not on file   Stress:     Feeling of Stress : Not on file   Social Connections:     Frequency of Communication with Friends and Family: Not on file    Frequency of Social Gatherings with Friends and Family: Not on file    Attends Islam Services: Not on file    Active Member of [address] [place] [address] [employer] or Organizations: Not on file    Attends Club or Organization Meetings: Not on file    Marital Status: Not on file   Intimate Partner Violence:     Fear of Current or Ex-Partner: Not on file    Emotionally Abused: Not on file    Physically Abused: Not on file    Sexually Abused: Not on file   Housing Stability:     Unable to Pay for Housing in the Last Year: Not on file    Number of [place] in the Last Year: Not on file    Unstable Housing in the Last Year: Not on file           ROS:  [x] All negative/unchanged except if checked.  Explain positive(checked items) below:  [] Constitutional  [] Eyes  [] Ear/Nose/Mouth/Throat  [] Respiratory  [] CV  [] GI  []   [] Musculoskeletal  [] Skin/Breast  [] Neurological  [] Endocrine  [] Heme/Lymph  [] Allergic/Immunologic    Explanation:     MEDICATIONS:    Current Facility-Administered Medications:     sterile water injection, , , ,     acetaminophen (TYLENOL) tablet 650 mg, 650 mg, Oral, Q4H PRN, Alisa Dodson MD    traZODone (DESYREL) tablet 50 mg, 50 mg, Oral, Nightly PRN, Alisa Dodson MD, 50 mg at 04/04/22 2139    benztropine mesylate (COGENTIN) injection 2 mg, 2 mg, IntraMUSCular, BID PRN, Alisa Dodson MD    magnesium hydroxide (MILK OF MAGNESIA) 400 MG/5ML suspension 30 mL, 30 mL, Oral, Daily PRN, Alisa Dodson MD    haloperidol lactate (HALDOL) injection 5 mg, 5 mg, IntraMUSCular, Q6H PRN **OR** haloperidol (HALDOL) tablet 5 mg, 5 mg, Oral, Q6H PRN, Rocio Rand MD, 5 mg at 04/04/22 2345    hydrOXYzine (VISTARIL) capsule 50 mg, 50 mg, Oral, Q6H PRN, 50 mg at 04/04/22 2345 **OR** hydrOXYzine (VISTARIL) injection 50 mg, 50 mg, IntraMUSCular, Q6H PRN, Rocio Rand MD    paliperidone (INVEGA) extended release tablet 3 mg, 3 mg, Oral, Daily, Rocio Rand MD, 3 mg at 04/04/22 1737    valproic acid (DEPAKENE) 250 MG/5ML oral solution 500 mg, 500 mg, Oral, BID, Rocio Rand MD, 500 mg at 04/04/22 1737      Examination:  /88   Pulse 98   Temp 97.7 °F (36.5 °C) (Oral)   Resp 22   SpO2 96%   Gait - steady  Medication side effects(SE): no    Mental Status Examination:    Level of consciousness:  within normal limits   Appearance:  poor grooming and poor hygiene  Behavior/Motor:  psychomotor agitation  Attitude toward examiner:  argumentative  Speech:  rapid, loud, hyperverbal and pressured   Mood: irritable and labile  Affect:  anxious  Thought processes:  rapid   Thought content:  Delusions:  grandiose  Perceptual Disturbance:  auditory  Cognition:  oriented to person, place, and time   Concentration poor  Insight poor   Judgement poor     ASSESSMENT:   Patient symptoms are:  [] Well controlled  [] Improving  [] Worsening  [] No change      Diagnosis:   Bipolar 1 krys with severe psychosis    LABS:    Recent Labs     04/03/22  1645   WBC 5.5   HGB 15.8        Recent Labs     04/03/22  1645      K 3.2*   CL 91*   CO2 27   BUN 4*   CREATININE 0.71   GLUCOSE 95     Recent Labs     04/03/22  1645   BILITOT 0.7   ALKPHOS 90   AST 35   ALT 18     Lab Results   Component Value Date    LABAMPH Neg 04/03/2022    BARBSCNU Neg 04/03/2022    LABBENZ Neg 04/03/2022    LABMETH Neg 04/03/2022    OPIATESCREENURINE Neg 04/03/2022    PHENCYCLIDINESCREENURINE Neg 04/03/2022    ETOH <10 04/03/2022     Lab Results   Component Value Date    TSH 2.590 04/03/2022     No results found for: LITHIUM  No results found for: VALPROATE, CBMZ    RISK ASSESSMENT: high risk of impulsivity    Treatment Plan:  Reviewed current Medications with the patient. Medication as ordered  Continue to 1 to 1  Will keep the patient in open seclusion room  Encourage pt to rest well and take medication  Risks, benefits, side effects, drug-to-drug interactions and alternatives to treatment were discussed. Collateral information:   CD evaluation  Encourage patient to attend group and other milieu activities.   Discharge planning discussed with the patient and treatment team.    PSYCHOTHERAPY/COUNSELING:  [x] Therapeutic interview  [x] Supportive  [] CBT  [] Ongoing  [] Other    [x] Patient continues to need, on a daily basis, active treatment furnished directly by or requiring the supervision of inpatient psychiatric personnel      Anticipated Length of stay:            Electronically signed by Jack Liz MD on 4/5/2022 at 9:42 AM

## 2022-04-05 NOTE — CONSULTS
[facility]  MEDICINE    HISTORY AND PHYSICAL EXAM    PATIENT NAME:  Rah Kelly    MRN:  [id number]  SERVICE DATE:  4/5/2022   SERVICE TIME:  8:56 AM    Primary Care Physician: No primary care provider on file. SUBJECTIVE  CHIEF COMPLAINT:  Medically appropriate for inpatient psychiatry admission. Consult for medical H/P encounter. HPI:  This is a 25 y.o. male who presents with no known PMHx presented to emergency room with symptoms of psychosis. Arrived with disorganized, confused, delayed and thought blocking. Patient cleared from emergency room for psychiatric care. Patient Seen, Chart, Labs, Radiologystudies, and Consults reviewed. Patient denies headache, chest pain, shortness of breath, N/V/D/C, fever/chills. PAST MEDICAL HISTORY:  No past medical history on file. PAST SURGICAL HISTORY:  No past surgical history on file. FAMILY HISTORY:  No family history on file. SOCIAL HISTORY:    Social History     Socioeconomic History    Marital status: Single     Spouse name: Not on file    Number of children: Not on file    Years of education: Not on file    Highest education level: Not on file   Occupational History    Not on file   Tobacco Use    Smoking status: Not on file    Smokeless tobacco: Not on file   Substance and Sexual Activity    Alcohol use: Not on file    Drug use: Not on file    Sexual activity: Not on file   Other Topics Concern    Not on file   Social History Narrative    Not on file     Social Determinants of Health     Financial Resource Strain:     Difficulty of Paying Living Expenses: Not on file   Food Insecurity:     Worried About Running Out of Food in the Last Year: Not on file    Audrey of Food in the Last Year: Not on file   Transportation Needs:     Lack of Transportation (Medical): Not on file    Lack of Transportation (Non-Medical):  Not on file   Physical Activity:     Days of Exercise per Week: Not on file    Minutes of Exercise per Session: Not on file   Stress:     Feeling of Stress : Not on file   Social Connections:     Frequency of Communication with Friends and Family: Not on file    Frequency of Social Gatherings with Friends and Family: Not on file    Attends Hindu Services: Not on file    Active Member of Clubs or Organizations: Not on file    Attends Club or Organization Meetings: Not on file    Marital Status: Not on file   Intimate Partner Violence:     Fear of Current or Ex-Partner: Not on file    Emotionally Abused: Not on file    Physically Abused: Not on file    Sexually Abused: Not on file   Housing Stability:     Unable to Pay for Housing in the Last Year: Not on file    Number of [place] in the Last Year: Not on file    Unstable Housing in the Last Year: Not on file     MEDICATIONS:    Current Facility-Administered Medications   Medication Dose Route Frequency Provider Last Rate Last Admin    sterile water injection             acetaminophen (TYLENOL) tablet 650 mg  650 mg Oral Q4H PRN Blas Bonilla MD        traZODone (DESYREL) tablet 50 mg  50 mg Oral Nightly PRN Blas Bonilla MD   50 mg at 04/04/22 2139    benztropine mesylate (COGENTIN) injection 2 mg  2 mg IntraMUSCular BID PRN Blas Bonilla MD        magnesium hydroxide (MILK OF MAGNESIA) 400 MG/5ML suspension 30 mL  30 mL Oral Daily PRN Blas Bonilla MD        haloperidol lactate (HALDOL) injection 5 mg  5 mg IntraMUSCular Q6H PRN Blas Bonilla MD        Or    haloperidol (HALDOL) tablet 5 mg  5 mg Oral Q6H PRN Blas Bonilla MD   5 mg at 04/04/22 2345    hydrOXYzine (VISTARIL) capsule 50 mg  50 mg Oral Q6H PRN Blas Bonilla MD   50 mg at 04/04/22 2345    Or    hydrOXYzine (VISTARIL) injection 50 mg  50 mg IntraMUSCular Q6H PRN Blas Bonilla MD        paliperidone (INVEGA) extended release tablet 3 mg  3 mg Oral Daily Blas Bonilla MD   3 mg at 04/04/22 1737    valproic acid (DEPAKENE) 250 MG/5ML oral solution 500 mg  500 mg Oral BID Tiffany White MD   500 mg at 04/04/22 1737       ALLERGIES: Patient has no known allergies. REVIEW OF SYSTEM:   ROS as noted in HPI, 12 point ROS reviewed and otherwise negative. OBJECTIVE  PHYSICAL EXAM: /88   Pulse 98   Temp 97.7 °F (36.5 °C) (Oral)   Resp 22   SpO2 96%   CONSTITUTIONAL: Asleep. Awakens to voice. EYES:  Lids and lashes normal, conjunctiva normal  ENT:  Normocephalic, without obvious abnormality, atraumatic, sinuses nontender on palpation, external ears without lesions, oral pharynx with moist mucus membranes, tonsils without erythema or exudates, gums normal and good dentition. NECK:  Supple, symmetrical, trachea midline, no adenopathy, thyroid symmetric, not enlarged and no tenderness, skin normal  LUNGS: Clear to auscultation bilaterally, no crackles or wheezing  CARDIOVASCULAR: Regular rate and rhythm, normal S1 and S2  ABDOMEN: Normal bowel sounds, soft, non-distended, non-tender  MUSCULOSKELETAL:  There is no redness, warmth, or swelling of the joints. NEUROLOGIC:  Awake, alert, oriented to name, place and time. SKIN:  Warm and dry    DATA:     Diagnostic tests reviewed for today's visit:    Most recent labs and imaging results reviewed. ASSESSMENT AND PLAN    Psychosis [facility])  Patient admitted to behavorial health for evaluation and treatment   S/p ativan and geodon administration    Hypokalemia: repeat potassium level. Replace if needed    Encounter for H&P    This is only a history and physical examination and not medical management. The patient is to contact and follow up with their primary care physician and go over any abnormal labs, imaging, findings, medical concerns, or conditions that we have and have not addressed during this encounter.     Plan of care discussed with: patient    SIGNATURE: SMITH Chavis NP  DATE: April 5, 2022  TIME: 8:56 AM

## 2022-04-05 NOTE — PROGRESS NOTES
Patient did not attend the 1000 skills group due to having intrusive behavior. Patient is now resting in his room.  Electronically signed by Abimbola Toledo [address] on 4/5/2022 at 11:22 AM

## 2022-04-05 NOTE — PROGRESS NOTES
Patient religiously preoccupied, often seen praying on his knees facing the wall. Patient also preoccupied with his assessment by the doctor, which he has been informed multiple times by multiple staff members that it will be tomorrow. Patient very difficult to redirect.

## 2022-04-05 NOTE — PROGRESS NOTES
Patient did not attend group despite staff encouragement.   Electronically signed by Frank Meredith on 4/4/2022 at 8:36 PM

## 2022-04-05 NOTE — PROGRESS NOTES
Pt was elevating this am expressing agitation , difficult to redirect, intrusive with other peers, pt wanted to leave saying he needed to save the world  staff was unable to get pt to understand medicated with geodon 20Im and ativan 2mg Im by rn.    Pt

## 2022-04-06 PROBLEM — F31.2 BIPOLAR AFFECTIVE DISORDER, MANIC, SEVERE, WITH PSYCHOTIC BEHAVIOR (HCC): Status: ACTIVE | Noted: 2022-04-04

## 2022-04-06 LAB
EKG ATRIAL RATE: 61 BPM
EKG P AXIS: 81 DEGREES
EKG P-R INTERVAL: 140 MS
EKG Q-T INTERVAL: 414 MS
EKG QRS DURATION: 94 MS
EKG QTC CALCULATION (BAZETT): 416 MS
EKG R AXIS: 83 DEGREES
EKG T AXIS: 71 DEGREES
EKG VENTRICULAR RATE: 61 BPM

## 2022-04-06 PROCEDURE — 1240000000 HC EMOTIONAL WELLNESS R&B

## 2022-04-06 PROCEDURE — 93010 ELECTROCARDIOGRAM REPORT: CPT | Performed by: INTERNAL MEDICINE

## 2022-04-06 PROCEDURE — 6370000000 HC RX 637 (ALT 250 FOR IP): Performed by: PSYCHIATRY & NEUROLOGY

## 2022-04-06 PROCEDURE — 99232 SBSQ HOSP IP/OBS MODERATE 35: CPT | Performed by: PSYCHIATRY & NEUROLOGY

## 2022-04-06 RX ADMIN — HYDROXYZINE PAMOATE 50 MG: 50 CAPSULE ORAL at 02:37

## 2022-04-06 RX ADMIN — PALIPERIDONE 6 MG: 6 TABLET, EXTENDED RELEASE ORAL at 10:06

## 2022-04-06 RX ADMIN — VALPROIC ACID 500 MG: 250 SOLUTION ORAL at 21:07

## 2022-04-06 RX ADMIN — TRAZODONE HYDROCHLORIDE 50 MG: 50 TABLET ORAL at 02:37

## 2022-04-06 RX ADMIN — VALPROIC ACID 500 MG: 250 SOLUTION ORAL at 09:29

## 2022-04-06 RX ADMIN — HYDROXYZINE PAMOATE 50 MG: 50 CAPSULE ORAL at 10:06

## 2022-04-06 NOTE — PROGRESS NOTES
Pt now standing awake in room. Pt unwilling to try to lay down and rest. Appears restless. Pt nonsensical at times. Pt remains preoccupied with d/c. Pt's eyes appear heavy and tired. Pt encouraged to rest. Pt offered PRN vistaril and PRN trazodone to promote rest/ relaxation. Pt attempting to go back and forth with staff to accept medication or not, playing with medication in hands. Pt finally agreeable and accepting of medication @ [phone]. Pt reports to 1:1 staff that \"a piece of the door frame is moving, maybe I do need some sleep\". Pt tucked into bed and encouraged to remain in bed. 1:1 maintained for safety.

## 2022-04-06 NOTE — PROGRESS NOTES
Pt voided large amount before lunch, and ate good lunch, drank bottled water, pt talked about getting over whelmed at college with exams and not sleeping for several days, stated he took mushrooms and couldn't stop hallucinating with this select group of people. When asked if pt was trying to fit in pt said yes, maybe, pt reports its nice to have friends and his main friend was going away for spring break, that he would like to call him thats why he wanted to leave. Pt reports he didn't want to be alone during spring break. Pt reports he is aware of of his behavior.

## 2022-04-06 NOTE — PROGRESS NOTES
Dilan Urias [facility]. FOLLOW-UP NOTE       4/6/2022     Patient was seen and examined in person, Chart reviewed   Patient's case discussed with staff/team    Chief Complaint: Elizabeth, psychosis    Interim History:     Pt starting to slow down with the current medication  Still having racing thoughts with pressured speech  Grandiose thinking and ability  Want to get out of [facility] to accomplish important task which cannot wait  Pt was informed about the diagnosis and the need to stay in hospital for further care  Pt is on 1 to 1 monitoring  Appetite:   [] Normal/Unchanged  [] Increased  [x] Decreased      Sleep:       [] Normal/Unchanged  [x] Fair       [] Poor              Energy:    [] Normal/Unchanged  [x] Increased  [] Decreased        SI [] Present  [] Absent    HI  []Present  [] Absent     Aggression:  [] yes  [] no    Patient is [] able  [x] unable to CONTRACT FOR SAFETY     PAST MEDICAL/PSYCHIATRIC HISTORY:   No past medical history on file. FAMILY/SOCIAL HISTORY:  No family history on file. Social History     Socioeconomic History    Marital status: Single     Spouse name: Not on file    Number of children: Not on file    Years of education: Not on file    Highest education level: Not on file   Occupational History    Not on file   Tobacco Use    Smoking status: Not on file    Smokeless tobacco: Not on file   Substance and Sexual Activity    Alcohol use: Not on file    Drug use: Not on file    Sexual activity: Not on file   Other Topics Concern    Not on file   Social History Narrative    Not on file     Social Determinants of Health     Financial Resource Strain:     Difficulty of Paying Living Expenses: Not on file   Food Insecurity:     Worried About Running Out of Food in the Last Year: Not on file    Audrey of Food in the Last Year: Not on file   Transportation Needs:     Lack of Transportation (Medical):  Not on file    Lack of Transportation (Non-Medical): Not on file   Physical Activity:     Days of Exercise per Week: Not on file    Minutes of Exercise per Session: Not on file   Stress:     Feeling of Stress : Not on file   Social Connections:     Frequency of Communication with Friends and Family: Not on file    Frequency of Social Gatherings with Friends and Family: Not on file    Attends Rastafarian Services: Not on file    Active Member of [address] [place] [address] [employer] or Organizations: Not on file    Attends Club or Organization Meetings: Not on file    Marital Status: Not on file   Intimate Partner Violence:     Fear of Current or Ex-Partner: Not on file    Emotionally Abused: Not on file    Physically Abused: Not on file    Sexually Abused: Not on file   Housing Stability:     Unable to Pay for Housing in the Last Year: Not on file    Number of [place] in the Last Year: Not on file    Unstable Housing in the Last Year: Not on file           ROS:  [x] All negative/unchanged except if checked.  Explain positive(checked items) below:  [] Constitutional  [] Eyes  [] Ear/Nose/Mouth/Throat  [] Respiratory  [] CV  [] GI  []   [] Musculoskeletal  [] Skin/Breast  [] Neurological  [] Endocrine  [] Heme/Lymph  [] Allergic/Immunologic    Explanation:     MEDICATIONS:    Current Facility-Administered Medications:     paliperidone (INVEGA) extended release tablet 6 mg, 6 mg, Oral, Daily, Artemio Esteban MD, 6 mg at 04/06/22 1006    LORazepam (ATIVAN) injection 1 mg, 1 mg, IntraMUSCular, Q6H PRN, Artemio Esteban MD    acetaminophen (TYLENOL) tablet 650 mg, 650 mg, Oral, Q4H PRN, Artemio Esteban MD    traZODone (DESYREL) tablet 50 mg, 50 mg, Oral, Nightly PRN, Artemio Esteban MD, 50 mg at 04/06/22 0237    benztropine mesylate (COGENTIN) injection 2 mg, 2 mg, IntraMUSCular, BID PRN, Artemio Esteban MD, 2 mg at 04/05/22 1354    magnesium hydroxide (MILK OF MAGNESIA) 400 MG/5ML suspension 30 mL, 30 mL, Oral, Daily PRN, Artemio Esteban MD  Claudia.Orf haloperidol lactate (HALDOL) injection 5 mg, 5 mg, IntraMUSCular, Q6H PRN **OR** haloperidol (HALDOL) tablet 5 mg, 5 mg, Oral, Q6H PRN, Pepe Fan MD, 5 mg at 04/04/22 2345    hydrOXYzine (VISTARIL) capsule 50 mg, 50 mg, Oral, Q6H PRN, 50 mg at 04/06/22 1006 **OR** hydrOXYzine (VISTARIL) injection 50 mg, 50 mg, IntraMUSCular, Q6H PRN, Pepe Fan MD    valproic acid (DEPAKENE) 250 MG/5ML oral solution 500 mg, 500 mg, Oral, BID, Pepe Fan MD, 500 mg at 04/06/22 5625      Examination:  /88   Pulse 102   Temp 97.3 °F (36.3 °C)   Resp 18   SpO2 100%   Gait - steady  Medication side effects(SE): no    Mental Status Examination:    Level of consciousness:  within normal limits   Appearance:  fair grooming and fair hygiene  Behavior/Motor:  psychomotor agitation  Attitude toward examiner:  playful  Speech:  rapid, hyperverbal and pressured   Mood: labile  Affect:  mood congruent  Thought processes:  flight of ideas   Thought content:  Delusions:  grandiose  Cognition:  oriented to person, place, and time   Concentration poor  Insight poor   Judgement poor     ASSESSMENT:   Patient symptoms are:  [] Well controlled  [] Improving  [] Worsening  [] No change      Diagnosis:   Principal Problem:    Bipolar affective disorder, manic, severe, with psychotic behavior ([address]  Resolved Problems:    * No resolved hospital problems.  *      LABS:    Recent Labs     04/03/22  1645   WBC 5.5   HGB 15.8        Recent Labs     04/03/22  1645 04/05/22  1357     --    K 3.2* 3.4   CL 91*  --    CO2 27  --    BUN 4*  --    CREATININE 0.71  --    GLUCOSE 95  --      Recent Labs     04/03/22  1645   BILITOT 0.7   ALKPHOS 90   AST 35   ALT 18     Lab Results   Component Value Date    LABAMPH Neg 04/03/2022    BARBSCNU Neg 04/03/2022    LABBENZ Neg 04/03/2022    LABMETH Neg 04/03/2022    OPIATESCREENURINE Neg 04/03/2022    PHENCYCLIDINESCREENURINE Neg 04/03/2022    ETOH <10 04/03/2022     Lab Results Component Value Date    TSH 2.590 04/03/2022     No results found for: LITHIUM  No results found for: VALPROATE, CBMZ    RISK ASSESSMENT: high risk of impulsivity    Treatment Plan:  Reviewed current Medications with the patient. Medication as ordered  Continue 1 to 1  Risks, benefits, side effects, drug-to-drug interactions and alternatives to treatment were discussed. Collateral information:   CD evaluation  Encourage patient to attend group and other milieu activities.   Discharge planning discussed with the patient and treatment team.    PSYCHOTHERAPY/COUNSELING:  [x] Therapeutic interview  [x] Supportive  [] CBT  [] Ongoing  [] Other    [x] Patient continues to need, on a daily basis, active treatment furnished directly by or requiring the supervision of inpatient psychiatric personnel      Anticipated Length of stay:            Electronically signed by Carlyn Thomas MD on 4/6/2022 at 3:57 PM

## 2022-04-06 NOTE — PROGRESS NOTES
Morning Community Meeting Topics    Phil Homans attended the morning community meeting on 4/6/22. Topics discussed today     [x] Introduction   Day of the week and date   Mask distribution   Current mask requirements  [x]Teams   Explanation of  Green and Blue team criteria   Nurses assigned to each team for today   Explanation about green and blue paper  o Date  o Patient's Name  o Patient's Nurse  o Goals  [x] Visitation   Announce the visiting hours for the day   Announce which team is allowed to have visitors for the day   Review any updated Covid 19 requirements for visitors during visitation  o Vaccine Card or negative Covid test within 48 hours of visit  o State Identification   Patients are reminded to alert the  at least 1 hour before visitation   [x] Unit Orientation   Coffee use   Phone location and etiquette   Shower locations  [employer] and dryer location and process   Common area expectations   Staff rounds expectation  [x] Meals    Educate patient to the menu  o The patient is encouraged to fill out the menu to get preferences at mealtime  o The patient is educated that if they do not fill out the menu, they will get the standard tray  o The coffee pot is decaf, patient encouraged to order regular coffee from menu.    Educate patient to the meal process   Patient encouraged to eat snacks provided twice daily  o Snacks may stay in patient room     [x] Discharge Process   Discharge expectations   Fill out the survey after discharge   [x] Hygiene   Daily showers encouraged  o Showers availability discussed    Daily dressing encouraged  o Discussed wearing street clothing   Education provided on where to place linens and clothing  o Linens in the hamper  o personal clothing does not go into the linen hamper  [x] Group    Patient encouraged to attend group provided   Time of Group Meetings discussed   Gentle reminder that attendance is a Physician order  [x] Movement   Chair exercises completed   Stretching completed  Notes: Did not state a goal. Electronically signed by Genie [address] on 4/6/2022 at 9:46 AM

## 2022-04-06 NOTE — GROUP NOTE
Group Therapy Note    Date: 4/5/2022    Group Start Time: 1930  Group End Time: 2030  Group Topic: Recreational    [facility]    AURELIO Mcgarry LSW        Group Therapy Note    Attendees: 11         Patient's Goal:  To participate in a recreational activity    Notes:  Patient participated in an icebreaker activity    Status After Intervention:  Improved    Participation Level: Interactive    Participation Quality: Appropriate      Speech:  normal      Thought Process/Content: Logical      Affective Functioning: Congruent      Mood: calm      Level of consciousness:  Alert      Response to Learning: Able to verbalize current knowledge/experience      Endings: None Reported    Modes of Intervention: Education      Discipline Responsible: /Counselor      Signature:  AURELIO Mcgarry LSW

## 2022-04-06 NOTE — FLOWSHEET NOTE
Received called from Dr. Olga Riojas.  expressed that patient may be too high acuity for to her treat.  Feels it may be in patient's best interest to be connected to a system with possible case management etc.

## 2022-04-06 NOTE — CARE COORDINATION
Spoke with pt's father. He reports that he is visiting [employer]. Pt agreed for his father to visit. Reports that pt may of had a neuro work up at [facility] in [place]. Informed dad that pt's pink slip is over tomorrow and pt will be approached to sign voluntary. Discussed pt's symptoms, severity, and him being on a 1:1 currently due to his symptoms and intrusive behaviors. Added dad to visitation list for tonight. Informed him he needs to bring his covid vaccine card.  Electronically signed by EMELINA Kyle on 4/6/2022 at 2:39 PM

## 2022-04-06 NOTE — CARE COORDINATION
Home: [phone]  Mom cell: [phone]  Amanda Niño: [phone]    Both parents are psychoanalyst/psychologist.       Spoke with mom. Pt signed LUANA for parents, reports pt has been like this for a year. Reports he had a break and was hospitalized in [place] at [facility] for two weeks. Reports pt then completed partial program for 2-3 weeks then went to [facility] for 6 weeks in [place] and pt chose not to stay after that point. First hosptialized was diagnosed with schizophrenia, but unsure if that is accurate    Partial program diagnosed with bipolar. Was medicated with zyprexa. [facility] diagnosed with thought disorder and was taken off his medication. Reports pt has an eating disorder. Reports his bmi at [facility] was 12. Then had to gain weight to get into [facility]. Then he started to feel overweight and that his eating was out of control. Mom does state that pt does have obsessional thinking and some psychotic features. Reports this has been happening for a year but there was some grandiosity prior to this. Pt was winnig awards prior to symptoms and was very successful. No family history of substance use. Paternal grandma had bipolar, hoarding issues. Paternal side great grandma had some sort of psychotic disorder was hospitalized at some point. Maternal side reports adhd. Pt's brother is on the spectrum but is very high functioning. Dr. Tonie Duong in [address]. Psychoanalyst in [address]. Reports pt is willing to take his medications but his meds have been changed so she is unsure if change in meds have increased symptoms. Reports they will come down from [place] to visit with patient. Reports this is a 2 hour drive. Denies firearms in the home. Mom is concerned that pt wont want to take time off from school. Reports he does not feel safe at parents home but reports being safe at school.  Reports that they witnessed pt stopping at [employer] as well on campus when they visit. Will call mom tomorrow with update. Mom will be emailing insurance card to [facility]. Reports he is on parents insurance of [employer] of [employer]ir [employer]z but also has [facility].  Electronically signed by EMELINA Boogie on 4/6/2022 at 9:36 AM

## 2022-04-06 NOTE — CARE COORDINATION
Patient did not attend  Group \"Connecting [place]\" and learn healthy tips for stress management at 1400, despite staff encouragement.      .Electronically signed by Marlene [address] on 4/6/2022 at 3:46 PM

## 2022-04-06 NOTE — GROUP NOTE
Group Therapy Note    Date: 4/6/2022    Group Start Time: 9937  Group End Time: 1700  Group Topic: Healthy Living/Wellness    [facility] 3W SRINI Ahmadi        Group Therapy Note    Attendees: 11/18         Patient's Goal:  To learn how to live a healthy lifestyle. Notes:  Patient participated in group discussion.     Status After Intervention:  Unchanged    Participation Level: Interactive    Participation Quality: Appropriate, Attentive and Sharing      Speech:  pressured      Thought Process/Content: Logical      Affective Functioning: Flat      Mood: euthymic      Level of consciousness:  Alert and Attentive      Response to Learning: Progressing to goal      Endings: None Reported    Modes of Intervention: Education      Discipline Responsible: [address] [facility] Tech      Signature:  Mary Ahmadi

## 2022-04-06 NOTE — PROGRESS NOTES
Pt continues on 1:1 observation d/t intrusive behaviors and the need for almost constant redirection. Pt repetitive and requiring reassurance from multiple staff members. Pt seemingly unable to process information given to him by any staff member. Pt intrusive with primarily staff, behaviors have been appropriate with peers during evening group with 1:1 staff. Pt talking to staff and engaged in conversations with 1:1 staff. Pt frequently talking about discharge and inquiring on what staff can do to expedite the discharge process. Pt was explained the importance of compliance with meds and treatments and proper sleep hygiene. Grandiosity noted. Pt more accepting of redirection however still requires several attempts of redirection before pt is accepting of instruction. 1:1 maintained for safety.

## 2022-04-06 NOTE — PROGRESS NOTES
Pt was agreeable to vistaril 50 mg with am invega that pt dropped on the floor this am at med pass ,pt appeared to avoid this nurse reminded pt that doctor ordered this meds according to his behavior, pt is less intrusive today less manic, noted walking the souza briskly at times with 1:1 at side, pt geoff liquid Depakote well, all meds were explained and reinforced what they were fore, and the we are trying to help the pt. mouth check done.

## 2022-04-06 NOTE — PROGRESS NOTES
Pt remains awake in assigned room, resting on bed. Pt talking and singing to self. 1:1 maintained for safety.

## 2022-04-06 NOTE — PROGRESS NOTES
Pt. refused to attend the 1000 skills group, despite staff encouragement. Electronically signed by Iris Estrada, [address] on 4/6/2022 at 11:23 AM

## 2022-04-06 NOTE — PROGRESS NOTES
Pt is eating lunch now in the dinning room across from female peer, pt is calm and non intrusive. 1;1 continues. Pt stated this am he is taking music in college and maybe plans not to go back to school maybe head to [place] with his friends. Pt was noted to be seen  jumping up and down in his room at times, talk to pt several times about trying to stay calm.

## 2022-04-07 PROCEDURE — 1240000000 HC EMOTIONAL WELLNESS R&B

## 2022-04-07 PROCEDURE — 99232 SBSQ HOSP IP/OBS MODERATE 35: CPT | Performed by: PSYCHIATRY & NEUROLOGY

## 2022-04-07 PROCEDURE — 6370000000 HC RX 637 (ALT 250 FOR IP): Performed by: PSYCHIATRY & NEUROLOGY

## 2022-04-07 RX ADMIN — VALPROIC ACID 500 MG: 250 SOLUTION ORAL at 20:26

## 2022-04-07 RX ADMIN — PALIPERIDONE 6 MG: 6 TABLET, EXTENDED RELEASE ORAL at 09:22

## 2022-04-07 RX ADMIN — VALPROIC ACID 500 MG: 250 SOLUTION ORAL at 09:21

## 2022-04-07 NOTE — PROGRESS NOTES
Pt awake @ this time, wanted to go out to day room for pencil, began doodling on paper , asked when phones come out. .. then returned to bed.

## 2022-04-07 NOTE — CARE COORDINATION
Family is interested in Residential program in [place] if pt was to need that level of care following discharge. Informed dad that pt 1:1 is discontinued, he is able to attend groups and he is less intrusive, manic and symptomatic. Discussed md potential recommendations of time off from school and linking with services in [facility] if he was to dc home. Or to provide servces in the area if he was to dc to [place]. Reports he received a text from pt's roommates stating that they are concerned with pt returning home. Discussed pt graduated high school at age16 and was taking advanced math in local colleges at age 16-12 and ran out of advanced college classes while in high school.  Electronically signed by EMELINA Puckett on 4/7/2022 at 12:36 PM

## 2022-04-07 NOTE — PROGRESS NOTES
Pt states he is waiting for the Dr to do rounds again,requesting to be D/C . Informed pt he will be back in AM, and can speak with him then. Pt stated,'' I have dreams that I want to be a reality. Like singing or being a musician. I want and need the world to shine brightly. I have a lot to give the world. I have 2 semesters left, but my friends only have one semester left. I want to leave with them,I wont graduate .'' Pt denies HI,SI, A/V hallucinations. Pt denies current depression or anxiety. Preoccupied with D/C.

## 2022-04-07 NOTE — FLOWSHEET NOTE
Patient has been out on unit, social with peers and attending groups. Patient is very pre-occupied with discharge. Patient was overheard on phone saying, \"What do I need to do to get out of here. \"  Patient appetite has not been very good today, he has been encouraged by staff to eat. Some peers were also seen encouraging patient to eat. Patient denies SI/HI and hallucinations, he also denies anxiety and depression but does appear anxious at time. George Mccarty)

## 2022-04-07 NOTE — GROUP NOTE
Group Therapy Note    Date: 4/7/2022    Group Start Time: 1000  Group End Time: 5734  Group Topic: Psychoeducation    [facility]    Jessenia Roy        Group Therapy Note    Attendees: 11         Patient's Goal:  \"To be discharged today\"     Notes:  Patient attended the 1000 skills group. Patient was calm, attentive to his task and participated adequately in group.      Status After Intervention:  Unchanged    Participation Level: Adequate    Participation Quality: Appropriate      Speech:  Talkative at times      Thought Process/Content: Linear      Affective Functioning: Flat      Mood: calm      Level of consciousness:  Alert      Response to Learning: Progressing to goal      Endings: None Reported    Modes of Intervention: Education, Socialization and Activity      Discipline Responsible: Psychoeducational Specialist      Signature:  Jessenia Roy

## 2022-04-07 NOTE — GROUP NOTE
Group Therapy Note    Date: 2022    Group Start Time: 1105  Group End Time:   Group Topic: Psychotherapy    [facility] 3W I    Norma Rothman [facility]        Group Therapy Note    Attendees: 13         Patient's Goal:  ***    Notes:  ***    Status After Intervention:  {Status After Intervention:265865580}    Participation Level: {Participation Level:971690663}    Participation Quality: {[facility] PARTICIPATION QUALITY:284727277}      Speech:  {[facility] CD_SPEECH:17642}      Thought Process/Content: {Thought Process/Content:603903135}      Affective Functioning: {Affective Functionin}      Mood: {Mood:863193212}      Level of consciousness:  {Level of consciousness:405624895}      Response to Learnin [facility] Responses to Learnin}      Endings: {[facility] Endings:77940}    Modes of Intervention: {[facility] Modes of Intervention:665661669}      Discipline Responsible: {[facility] Multidisciplinary:306805370}      Signature:  Norma Rothman, [facility]

## 2022-04-07 NOTE — GROUP NOTE
Group Therapy Note    Date: 4/7/2022    Group Start Time: 9685  Group End Time: 1400  Group Topic: Healthy Living/Wellness    [facility] 3W NGOZI Hernandez RN; Aimee Sevilla RN        Group Therapy Note    Attendees: 10/18         Patient's Goal:  To attend healthy living group    Notes:  Patient participated    Status After Intervention:  Improved    Participation Level:  Active Listener and Interactive    Participation Quality: Appropriate, Attentive, Sharing and Supportive      Speech:  normal      Thought Process/Content: Logical  Linear      Affective Functioning: Congruent      Mood: WNL      Level of consciousness:  Alert, Oriented x4 and Attentive      Response to Learning: Able to verbalize current knowledge/experience, Able to verbalize/acknowledge new learning, Able to retain information and Capable of insight      Endings: None Reported    Modes of Intervention: Education and Support      Discipline Responsible: Registered Nurse      Signature:  Aimee Sevilla RN

## 2022-04-07 NOTE — GROUP NOTE
Group Therapy Note    Date: 4/6/2022    Group Start Time: 2000  Group End Time: 2030  Group Topic: Recreational    [facility]    Kalpana Mention        Group Therapy Note    Attendees: 12/18         Patient's Goal:  To play Hyla Hands[employer] Wii and/or socialize with the group. Notes:  Patient played the game with peers.     Status After Intervention:  Improved    Participation Level: Interactive    Participation Quality: Appropriate and Attentive      Speech:  normal      Thought Process/Content: Logical      Affective Functioning: Congruent      Mood: euthymic      Level of consciousness:  Alert and Attentive      Response to Learning: Progressing to goal      Endings: None Reported    Modes of Intervention: Activity      Discipline Responsible: [employer]      Signature:  Kalpana Mention Spoke to pt, she received 4-6 stitches on right side cheek. She got stitches 9/18/17. Pt states the doctor in [facility] ([facility]) told her to have them removed 9/22/17. Please advise.

## 2022-04-07 NOTE — PROGRESS NOTES
Pt is eating breakfast in the dinning room, noted to be eating well, no problems swalllowing noted, 1:1 continues, pt denied any anxiety or depression, reviewed am meds, pt reports he is calmer, able to sit for longer time eating .

## 2022-04-07 NOTE — PROGRESS NOTES
Morning Community Meeting Topics    Linda Clark attended the morning community meeting on 4/7/22. Topics discussed today     [x] Introduction   Day of the week and date   Mask distribution   Current mask requirements  [x]Teams   Explanation of  Green and Blue team criteria   Nurses assigned to each team for today   Explanation about green and blue paper  o Date  o Patient's Name  o Patient's Nurse  o Goals  [x] Visitation   Announce the visiting hours for the day   Announce which team is allowed to have visitors for the day   Review any updated Covid 19 requirements for visitors during visitation  o Vaccine Card or negative Covid test within 48 hours of visit  o State Identification   Patients are reminded to alert the  at least 1 hour before visitation   [x] Unit Orientation   Coffee use   Phone location and etiquette   Shower locations  [employer] and dryer location and process   Common area expectations   Staff rounds expectation  [x] Meals    Educate patient to the menu  o The patient is encouraged to fill out the menu to get preferences at mealtime  o The patient is educated that if they do not fill out the menu, they will get the standard tray  o The coffee pot is decaf, patient encouraged to order regular coffee from menu.    Educate patient to the meal process   Patient encouraged to eat snacks provided twice daily  o Snacks may stay in patient room     [x] Discharge Process   Discharge expectations   Fill out the survey after discharge   [x] Hygiene   Daily showers encouraged  o Showers availability discussed    Daily dressing encouraged  o Discussed wearing street clothing   Education provided on where to place linens and clothing  o Linens in the hamper  o personal clothing does not go into the linen hamper  [x] Group    Patient encouraged to attend group provided   Time of Group Meetings discussed   Gentle reminder that attendance is a Physician order  [x] Movement   Chair exercises completed   Stretching completed  Notes:Goal - \"To be discharge today\" Electronically signed by CINDY Meza on 4/7/2022 at 1:16 PM

## 2022-04-07 NOTE — PROGRESS NOTES
Pt woke up around 0730 and decided to take a shower. Pt seen kneeling in the bathroom and praying. Pt out to day area and wanted to write down his schedule for the day. At times speaks rapidly, at other times experiences thought blocking and speaks slowly. Pt often stares -- into space and at staff and peers. Pt was not interested in eating breakfast and decided to exercise in his room instead. Preoccupied with discharge by 11am and getting in contact with his friend Panchosasha Apodaca so he can play music with him today.     Electronically signed by Jaylene Williamson on 4/7/2022 at 8:51 AM

## 2022-04-07 NOTE — PROGRESS NOTES
Pt awake @ this time, up to BR to void then returned to bed. Pt has been sleeping all night. 1:1 staff @ bedside.

## 2022-04-07 NOTE — PROGRESS NOTES
Dilan Urias [facility]. FOLLOW-UP NOTE       4/7/2022     Patient was seen and examined in person, Chart reviewed   Patient's case discussed with staff/team    Chief Complaint: Elizabeth, psychosis    Interim History:     Pt starting to slow down with the current medication  Still having racing thoughts with pressured speech  Grandiose thinking and ability  Want to get out of [address] ASAP to accomplish important task which cannot wait  Pt was informed about the diagnosis and the need to stay in hospital for further care  Pt is on 1 to 1 monitoring  Appetite:   [] Normal/Unchanged  [] Increased  [x] Decreased      Sleep:       [] Normal/Unchanged  [x] Fair       [] Poor              Energy:    [] Normal/Unchanged  [x] Increased  [] Decreased        SI [] Present  [] Absent    HI  []Present  [] Absent     Aggression:  [] yes  [] no    Patient is [] able  [x] unable to CONTRACT FOR SAFETY     PAST MEDICAL/PSYCHIATRIC HISTORY:   No past medical history on file. FAMILY/SOCIAL HISTORY:  No family history on file. Social History     Socioeconomic History    Marital status: Single     Spouse name: Not on file    Number of children: Not on file    Years of education: Not on file    Highest education level: Not on file   Occupational History    Not on file   Tobacco Use    Smoking status: Not on file    Smokeless tobacco: Not on file   Substance and Sexual Activity    Alcohol use: Not on file    Drug use: Not on file    Sexual activity: Not on file   Other Topics Concern    Not on file   Social History Narrative    Not on file     Social Determinants of Health     Financial Resource Strain:     Difficulty of Paying Living Expenses: Not on file   Food Insecurity:     Worried About Running Out of Food in the Last Year: Not on file    Audrey of Food in the Last Year: Not on file   Transportation Needs:     Lack of Transportation (Medical):  Not on file    Lack of Transportation (Non-Medical): Not on file   Physical Activity:     Days of Exercise per Week: Not on file    Minutes of Exercise per Session: Not on file   Stress:     Feeling of Stress : Not on file   Social Connections:     Frequency of Communication with Friends and Family: Not on file    Frequency of Social Gatherings with Friends and Family: Not on file    Attends Yazdanism Services: Not on file    Active Member of [address] [place] [address] or Organizations: Not on file    Attends Club or Organization Meetings: Not on file    Marital Status: Not on file   Intimate Partner Violence:     Fear of Current or Ex-Partner: Not on file    Emotionally Abused: Not on file    Physically Abused: Not on file    Sexually Abused: Not on file   Housing Stability:     Unable to Pay for Housing in the Last Year: Not on file    Number of [place] in the Last Year: Not on file    Unstable Housing in the Last Year: Not on file           ROS:  [x] All negative/unchanged except if checked.  Explain positive(checked items) below:  [] Constitutional  [] Eyes  [] Ear/Nose/Mouth/Throat  [] Respiratory  [] CV  [] GI  []   [] Musculoskeletal  [] Skin/Breast  [] Neurological  [] Endocrine  [] Heme/Lymph  [] Allergic/Immunologic    Explanation:     MEDICATIONS:    Current Facility-Administered Medications:     paliperidone (INVEGA) extended release tablet 6 mg, 6 mg, Oral, Daily, Lynne Dumont MD, 6 mg at 04/07/22 3862    LORazepam (ATIVAN) injection 1 mg, 1 mg, IntraMUSCular, Q6H PRN, Lynne Dumont MD    acetaminophen (TYLENOL) tablet 650 mg, 650 mg, Oral, Q4H PRN, Lynne Dumont MD    traZODone (DESYREL) tablet 50 mg, 50 mg, Oral, Nightly PRN, Lynne Dumont MD, 50 mg at 04/06/22 0237    benztropine mesylate (COGENTIN) injection 2 mg, 2 mg, IntraMUSCular, BID PRN, Lynne Dumont MD, 2 mg at 04/05/22 1354    magnesium hydroxide (MILK OF MAGNESIA) 400 MG/5ML suspension 30 mL, 30 mL, Oral, Daily PRN, MD Diann Moe haloperidol lactate (HALDOL) injection 5 mg, 5 mg, IntraMUSCular, Q6H PRN **OR** haloperidol (HALDOL) tablet 5 mg, 5 mg, Oral, Q6H PRN, Casandra Horner MD, 5 mg at 04/04/22 2345    hydrOXYzine (VISTARIL) capsule 50 mg, 50 mg, Oral, Q6H PRN, 50 mg at 04/06/22 1006 **OR** hydrOXYzine (VISTARIL) injection 50 mg, 50 mg, IntraMUSCular, Q6H PRN, Casandra Horner MD    valproic acid (DEPAKENE) 250 MG/5ML oral solution 500 mg, 500 mg, Oral, BID, Casandra Horner MD, 500 mg at 04/07/22 0055      Examination:  /86   Pulse 90   Temp 97.7 °F (36.5 °C) (Oral)   Resp 16   SpO2 100%   Gait - steady  Medication side effects(SE): no    Mental Status Examination:    Level of consciousness:  within normal limits   Appearance:  fair grooming and fair hygiene  Behavior/Motor:less  psychomotor agitation  Attitude toward examiner:  playful  Speech:  Less rapid, hyperverbal and pressured   Mood:less  labile  Affect:  mood congruent  Thought processes:  Mild FOI, able to be redirected  Thought content:  Delusions:  Less grandiose  Cognition:  oriented to person, place, and time   Concentration poor  Insight better  Judgement poor     ASSESSMENT:   Patient symptoms are:  [] Well controlled  [] Improving  [] Worsening  [] No change      Diagnosis:   Principal Problem:    Bipolar affective disorder, manic, severe, with psychotic behavior ([facility] [address]  Resolved Problems:    * No resolved hospital problems. *      LABS:    No results for input(s): WBC, HGB, PLT in the last 72 hours. Recent Labs     04/05/22  1357   K 3.4     No results for input(s): BILITOT, ALKPHOS, AST, ALT in the last 72 hours.   Lab Results   Component Value Date    LABAMPH Neg 04/03/2022    BARBSCNU Neg 04/03/2022    LABBENZ Neg 04/03/2022    LABMETH Neg 04/03/2022    OPIATESCREENURINE Neg 04/03/2022    PHENCYCLIDINESCREENURINE Neg 04/03/2022    ETOH <10 04/03/2022     Lab Results   Component Value Date    TSH 2.590 04/03/2022     No results found for: LITHIUM  No results found for: VALPROATE, CBMZ    RISK ASSESSMENT: high risk of impulsivity    Treatment Plan:  Reviewed current Medications with the patient. Medication as ordered  D/C sitter  Risks, benefits, side effects, drug-to-drug interactions and alternatives to treatment were discussed. Collateral information:   CD evaluation  Encourage patient to attend group and other milieu activities.   Discharge planning discussed with the patient and treatment team.    PSYCHOTHERAPY/COUNSELING:  [x] Therapeutic interview  [x] Supportive  [] CBT  [] Ongoing  [] Other    [x] Patient continues to need, on a daily basis, active treatment furnished directly by or requiring the supervision of inpatient psychiatric personnel      Anticipated Length of stay:            Electronically signed by Roselia Mckay MD on 4/7/2022 at 2:32 PM

## 2022-04-07 NOTE — GROUP NOTE
Group Therapy Note    Date: 4/7/2022    Group Start Time: 6010  Group End Time: 1700  Group Topic: Healthy Living/Wellness    [facility]    Grey Meredith        Group Therapy Note    Attendees: 9/14         Patient's Goal:  To learn about healthy coping skills. Notes:  Patient participated in group with peers. Patient requested and received handouts on coping skills.     Status After Intervention:  Unchanged    Participation Level: Interactive    Participation Quality: Appropriate and Attentive      Speech:  normal      Thought Process/Content: Logical      Affective Functioning: Congruent      Mood: euthymic      Level of consciousness:  Alert and Attentive      Response to Learning: Able to verbalize current knowledge/experience      Endings: None Reported    Modes of Intervention: Education      Discipline Responsible: [address], [employer]      Signature:  Frank Meredith

## 2022-04-08 PROCEDURE — 1240000000 HC EMOTIONAL WELLNESS R&B

## 2022-04-08 PROCEDURE — 90833 PSYTX W PT W E/M 30 MIN: CPT | Performed by: PSYCHIATRY & NEUROLOGY

## 2022-04-08 PROCEDURE — 99232 SBSQ HOSP IP/OBS MODERATE 35: CPT | Performed by: PSYCHIATRY & NEUROLOGY

## 2022-04-08 PROCEDURE — 6370000000 HC RX 637 (ALT 250 FOR IP): Performed by: PSYCHIATRY & NEUROLOGY

## 2022-04-08 RX ADMIN — VALPROIC ACID 500 MG: 250 SOLUTION ORAL at 20:34

## 2022-04-08 RX ADMIN — PALIPERIDONE 6 MG: 6 TABLET, EXTENDED RELEASE ORAL at 09:01

## 2022-04-08 RX ADMIN — VALPROIC ACID 500 MG: 250 SOLUTION ORAL at 09:01

## 2022-04-08 NOTE — PROGRESS NOTES
Pt has been at desk several times requesting multiple snacks, pt requesting granola bars,jello, etc. At snack time,pt had a sandwich & granola bar  & soda. Pt was then observed going into empty tray cart looking for food. Pt immed. redirected away from cart.

## 2022-04-08 NOTE — GROUP NOTE
Group Therapy Note    Date: 4/8/2022    Group Start Time: 1400  Group End Time: 1430  Group Topic: Psychoeducation    [facility] AURELIO Hurt, WHITLEYW        Group Therapy Note    Attendees: 6         Patient's Goal:  To participate in a Psychoeducational group therapy    Notes:  Patient participated in Mindfulness techniques    Status After Intervention:  Improved    Participation Level: Interactive    Participation Quality: Appropriate      Speech:  normal      Thought Process/Content: Logical      Affective Functioning: Congruent      Mood: calm      Level of consciousness:  Alert      Response to Learning: Able to verbalize current knowledge/experience      Endings: None Reported    Modes of Intervention: Education      Discipline Responsible: /Counselor      Signature:  AURELIO Campbell, ZE

## 2022-04-08 NOTE — GROUP NOTE
Group Therapy Note    Date: 4/8/2022    Group Start Time: 1100  Group End Time: 8278  Group Topic: Psychotherapy    [facility]    EMELINA Velarde        Group Therapy Note    Attendees: 9/17         Patient's Goal:  \" I want to go play music with my friends. \"    Notes:  Appeared less intrusive     Status After Intervention:  Improved    Participation Level:  Active Listener and Interactive    Participation Quality: Appropriate, Attentive, Sharing and Supportive      Speech:  pressured and loud      Thought Process/Content: Flight of ideas      Affective Functioning: Exaggerated      Mood: elevated      Level of consciousness:  Alert      Response to Learning: Progressing to goal      Endings: None Reported    Modes of Intervention: Education      Discipline Responsible: /Counselor      Signature:  EMELINA Velarde

## 2022-04-08 NOTE — GROUP NOTE
Group Therapy Note    Date: 4/7/2022    Group Start Time: 1955  Group End Time: 2025  Group Topic: Recreational    [facility]    Justyn Gambino        Group Therapy Note    Attendees: 10/14         Patient's Goal:  To play Wii Bowling with the group. Notes:  Patient played the game with peers.      Status After Intervention:  Unchanged    Participation Level: Interactive    Participation Quality: Appropriate and Attentive      Speech:  normal      Thought Process/Content: Linear      Affective Functioning: Flat      Mood: euthymic      Level of consciousness:  Alert and Attentive      Response to Learning: Progressing to goal      Endings: None Reported    Modes of Intervention: Activity      Discipline Responsible: [address], [employer]      Signature:  Justyn Gambino

## 2022-04-08 NOTE — PROGRESS NOTES
Morning Community Meeting Topics    Lauren Muñoz attended the morning community meeting on 4/8/22. Topics discussed today     [x] Introduction   Day of the week and date   Mask distribution   Current mask requirements  [x]Teams   Explanation of  Green and Blue team criteria   Nurses assigned to each team for today   Explanation about green and blue paper  o Date  o Patient's Name  o Patient's Nurse  o Goals  [x] Visitation   Announce the visiting hours for the day   Announce which team is allowed to have visitors for the day   Review any updated Covid 19 requirements for visitors during visitation  o Vaccine Card or negative Covid test within 48 hours of visit  o State Identification   Patients are reminded to alert the  at least 1 hour before visitation   [x] Unit Orientation   Coffee use   Phone location and etiquette   Shower locations  [employer] and dryer location and process   Common area expectations   Staff rounds expectation  [x] Meals    Educate patient to the menu  o The patient is encouraged to fill out the menu to get preferences at mealtime  o The patient is educated that if they do not fill out the menu, they will get the standard tray  o The coffee pot is decaf, patient encouraged to order regular coffee from menu.    Educate patient to the meal process   Patient encouraged to eat snacks provided twice daily  o Snacks may stay in patient room     [x] Discharge Process   Discharge expectations   Fill out the survey after discharge   [x] Hygiene   Daily showers encouraged  o Showers availability discussed    Daily dressing encouraged  o Discussed wearing street clothing   Education provided on where to place linens and clothing  o Linens in the hamper  o personal clothing does not go into the linen hamper  [x] Group    Patient encouraged to attend group provided   Time of Group Meetings discussed   Gentle reminder that attendance is a Physician order  [x] Movement   Chair exercises completed   Stretching completed  Notes: GOAL : \" to do the work I need to do before breakfast\" Electronically signed by Lucretia Henderson on 4/8/2022 at 9:15 AM

## 2022-04-08 NOTE — GROUP NOTE
Group Therapy Note    Date: 4/8/2022    Group Start Time: 1300  Group End Time: 1400  Group Topic: Healthy Living/Wellness    [facility]    Jordan De Los Santos RN; Chato Phillip RN        Group Therapy Note    Attendees: 6/12         Patient's Goal:  To attend healthy living group-mindfulness    Notes:  Pt participated    Status After Intervention:  Improved    Participation Level:  Active Listener and Interactive    Participation Quality: Appropriate, Attentive, Sharing and Supportive      Speech:  normal      Thought Process/Content: Logical  Linear      Affective Functioning: Congruent      Mood: elevated      Level of consciousness:  Alert, oriented, attentive      Response to Learning: Able to verbalize current knowledge/experience, Able to verbalize/acknowledge new learning, Able to retain information and Capable of insight      Endings: None Reported    Modes of Intervention: Education and Support      Discipline Responsible: Registered Nurse      Signature:  Jordan De Los Santos RN

## 2022-04-08 NOTE — PROGRESS NOTES
[facility]il[facility] [address] [address] FOLLOW-UP NOTE       4/8/2022     Patient was seen and examined in person, Chart reviewed   Patient's case discussed with staff/team    Chief Complaint: Elizabeth, psychosis    Interim History:     Pt continue to make progress  Less manic symptoms  Thought process - better with less racing thoughts and FOI  Slept better last night  Pt is planning for after discharge plan  Attending groups  Appetite:   [] Normal/Unchanged  [] Increased  [x] Decreased      Sleep:       [] Normal/Unchanged  [x] Fair       [] Poor              Energy:    [] Normal/Unchanged  [x] Increased  [] Decreased        SI [] Present  [] Absent    HI  []Present  [] Absent     Aggression:  [] yes  [] no    Patient is [] able  [x] unable to CONTRACT FOR SAFETY     PAST MEDICAL/PSYCHIATRIC HISTORY:   No past medical history on file. FAMILY/SOCIAL HISTORY:  No family history on file. Social History     Socioeconomic History    Marital status: Single     Spouse name: Not on file    Number of children: Not on file    Years of education: Not on file    Highest education level: Not on file   Occupational History    Not on file   Tobacco Use    Smoking status: Not on file    Smokeless tobacco: Not on file   Substance and Sexual Activity    Alcohol use: Not on file    Drug use: Not on file    Sexual activity: Not on file   Other Topics Concern    Not on file   Social History Narrative    Not on file     Social Determinants of Health     Financial Resource Strain:     Difficulty of Paying Living Expenses: Not on file   Food Insecurity:     Worried About Running Out of Food in the Last Year: Not on file    Audrey of Food in the Last Year: Not on file   Transportation Needs:     Lack of Transportation (Medical): Not on file    Lack of Transportation (Non-Medical):  Not on file   Physical Activity:     Days of Exercise per Week: Not on file    Minutes of Exercise per Session: Not on file Stress:     Feeling of Stress : Not on file   Social Connections:     Frequency of Communication with Friends and Family: Not on file    Frequency of Social Gatherings with Friends and Family: Not on file    Attends Druze Services: Not on file    Active Member of Clubs or Organizations: Not on file    Attends Club or Organization Meetings: Not on file    Marital Status: Not on file   Intimate Partner Violence:     Fear of Current or Ex-Partner: Not on file    Emotionally Abused: Not on file    Physically Abused: Not on file    Sexually Abused: Not on file   Housing Stability:     Unable to Pay for Housing in the Last Year: Not on file    Number of [place] in the Last Year: Not on file    Unstable Housing in the Last Year: Not on file           ROS:  [x] All negative/unchanged except if checked.  Explain positive(checked items) below:  [] Constitutional  [] Eyes  [] Ear/Nose/Mouth/Throat  [] Respiratory  [] CV  [] GI  []   [] Musculoskeletal  [] Skin/Breast  [] Neurological  [] Endocrine  [] Heme/Lymph  [] Allergic/Immunologic    Explanation:     MEDICATIONS:    Current Facility-Administered Medications:     paliperidone (INVEGA) extended release tablet 6 mg, 6 mg, Oral, Daily, Rocio Rand MD, 6 mg at 04/08/22 0901    LORazepam (ATIVAN) injection 1 mg, 1 mg, IntraMUSCular, Q6H PRN, Rocio Rand MD    acetaminophen (TYLENOL) tablet 650 mg, 650 mg, Oral, Q4H PRN, Rocio Rand MD    traZODone (DESYREL) tablet 50 mg, 50 mg, Oral, Nightly PRN, Rocio Rand MD, 50 mg at 04/06/22 0063    benztropine mesylate (COGENTIN) injection 2 mg, 2 mg, IntraMUSCular, BID PRN, Rocio Rand MD, 2 mg at 04/05/22 1354    magnesium hydroxide (MILK OF MAGNESIA) 400 MG/5ML suspension 30 mL, 30 mL, Oral, Daily PRN, Rocio Rand MD    haloperidol lactate (HALDOL) injection 5 mg, 5 mg, IntraMUSCular, Q6H PRN **OR** haloperidol (HALDOL) tablet 5 mg, 5 mg, Oral, Q6H PRN, Ap [place] Kristen Gentile MD, 5 mg at 04/04/22 2345    hydrOXYzine (VISTARIL) capsule 50 mg, 50 mg, Oral, Q6H PRN, 50 mg at 04/06/22 1006 **OR** hydrOXYzine (VISTARIL) injection 50 mg, 50 mg, IntraMUSCular, Q6H PRN, Zuleyma Cannon MD    valproic acid (DEPAKENE) 250 MG/5ML oral solution 500 mg, 500 mg, Oral, BID, Zuleyma Cannon MD, 500 mg at 04/08/22 0901      Examination:  /86   Pulse 103   Temp 97.9 °F (36.6 °C)   Resp 17   SpO2 100%   Gait - steady  Medication side effects(SE): no    Mental Status Examination:    Level of consciousness:  within normal limits   Appearance:  fair grooming and fair hygiene  Behavior/Motor:less  psychomotor agitation  Attitude toward examiner:  playful  Speech:  Less rapid, hyperverbal and pressured   Mood:less  labile  Affect:  mood congruent  Thought processes:  Mild FOI, able to be redirected  Thought content:  Delusions:  Less grandiose  Cognition:  oriented to person, place, and time   Concentration poor  Insight better  Judgement poor     ASSESSMENT:   Patient symptoms are:  [] Well controlled  [] Improving  [] Worsening  [] No change      Diagnosis:   Principal Problem:    Bipolar affective disorder, manic, severe, with psychotic behavior ([facility] [address]  Resolved Problems:    * No resolved hospital problems. *      LABS:    No results for input(s): WBC, HGB, PLT in the last 72 hours. Recent Labs     04/05/22  1357   K 3.4     No results for input(s): BILITOT, ALKPHOS, AST, ALT in the last 72 hours. Lab Results   Component Value Date    LABAMPH Neg 04/03/2022    BARBSCNU Neg 04/03/2022    LABBENZ Neg 04/03/2022    LABMETH Neg 04/03/2022    OPIATESCREENURINE Neg 04/03/2022    PHENCYCLIDINESCREENURINE Neg 04/03/2022    ETOH <10 04/03/2022     Lab Results   Component Value Date    TSH 2.590 04/03/2022     No results found for: LITHIUM  No results found for: VALPROATE, CBMZ      Treatment Plan:  Reviewed current Medications with the patient.    Medication as ordered  Depakote level tomorrow  Risks, benefits, side effects, drug-to-drug interactions and alternatives to treatment were discussed. Collateral information:   CD evaluation  Encourage patient to attend group and other milieu activities. Discharge planning discussed with the patient and treatment team.    PSYCHOTHERAPY/COUNSELING:  [x] Therapeutic interview  [x] Supportive  [] CBT  [] Ongoing  [] Other  Patient was seen 1:1 for 20 minutes, other than E&M time spent, focusing on      - coping skills techniques     - Anxiety management techniques discussed including deep breathing exercise and PMR     - discussing patients strength and weakness      - Motivational interviewing to assess the stage of change and assessing patient readiness to quit substance use.      - Focusing on negative cognition and maladaptive thoughts, which is feeding and maintaining the depression symptoms     - DBT techniques explained to the patient    [x] Patient continues to need, on a daily basis, active treatment furnished directly by or requiring the supervision of inpatient psychiatric personnel      Anticipated Length of stay:            Electronically signed by Carlyn Thomas MD on 4/8/2022 at 12:07 PM

## 2022-04-08 NOTE — PROGRESS NOTES
Pt visible on unit. Seen eating meals, attending groups and socializing with peers. Bright affect noted today. Pleasant and cooperative with staff and peers. Bizarre statements at times. Poor insight on mental health and focused on D/C. 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 Believes he is fine and could have D/C today. Reports poor sleep last night and eating \"too much\" today. Denies any SI, HI or AHV. Does endorse depression and anxiety. States, \"I think everyone has some depression or anxiety but mine is fine\". Currently in day room socializing with peers.

## 2022-04-08 NOTE — GROUP NOTE
Group Therapy Note    Date: 4/8/2022    Group Start Time: 8840  Group End Time: 1900  Group Topic: Recreational    [facility]    Jen Huertas RN        Group Therapy Note    Attendees: 5/10         Patient's Goal:  Rec group    Notes:  Alert and appropriate    Status After Intervention:  Unchanged    Participation Level:  Active Listener and Interactive    Participation Quality: Appropriate and Attentive      Speech:  normal      Thought Process/Content: Logical      Affective Functioning: Congruent      Mood: euthymic      Level of consciousness:  Alert and Oriented x4      Response to Learning: Progressing to goal      Endings: None Reported    Modes of Intervention: Education and Support      Discipline Responsible: Registered Nurse      Signature:  Jen Huertas RN

## 2022-04-08 NOTE — GROUP NOTE
Group Therapy Note    Date: 4/7/2022    Group Start Time: 2025  Group End Time: 2035  Group Topic: Wrap-Up    [facility]    Loki Man        Group Therapy Note    Attendees: 10/14         Patient's Goal:  \"be able to leave by 11 am\"    Notes:  Patient reported not meeting their goal for the day. Patient talked att length about why he should be discharged (to meet someone to play music.) Patient shared enjoying talking to peers today.     Status After Intervention:  Unchanged    Participation Level: Interactive    Participation Quality: Appropriate, Attentive and Sharing      Speech:  pressured      Thought Process/Content: Perseverating      Affective Functioning: Flat      Mood: elevated      Level of consciousness:  Alert and Attentive      Response to Learning: Progressing to goal      Endings: None Reported    Modes of Intervention: Support      Discipline Responsible: [address], [employer] [id number]      Signature:  Loki Man

## 2022-04-08 NOTE — PROGRESS NOTES
Patient out on untit, social with select peers, attending groups. 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 Today is the day.   He is supposed to be meeting up with a friend

## 2022-04-08 NOTE — GROUP NOTE
Group Therapy Note    Date: 4/8/2022    Group Start Time: 1000  Group End Time: 1100  Group Topic: Psychoeducation    [facility]    Melva Haro, CTRS        Group Therapy Note    Attendees: 8         Patient's Goal:  \"to do the work I need to do before breakfast\"     Notes:  Pt. attended the 1000 skill group. Pt. had difficulty deciding what activity to work on, despite direction. Was praying with eyes closed and hands folded. Status After Intervention:  Unchanged    Participation Level:  Active Listener and Interactive    Participation Quality: Appropriate, Attentive and Inappropriate      Speech:  normal      Thought Process/Content: Logical  Flight of ideas      Affective Functioning: Congruent      Mood: slightly elevated, anxious and calm      Level of consciousness:  Alert, Oriented x4, Attentive and Preoccupied      Response to Learning: Able to change behavior and Progressing to goal      Endings: None Reported    Modes of Intervention: Education, Support, Socialization and Activity      Discipline Responsible: Psychoeducational Specialist      Signature:  Anastacio Pérez

## 2022-04-08 NOTE — PROGRESS NOTES
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 Encouraged pt to continue to take meds,attend groups, eat meals and to sleep at night.  Pt returned to room

## 2022-04-09 LAB — VALPROIC ACID LEVEL: 109.3 UG/ML (ref 50–100)

## 2022-04-09 PROCEDURE — 80164 ASSAY DIPROPYLACETIC ACD TOT: CPT

## 2022-04-09 PROCEDURE — 6370000000 HC RX 637 (ALT 250 FOR IP): Performed by: PSYCHIATRY & NEUROLOGY

## 2022-04-09 PROCEDURE — 1240000000 HC EMOTIONAL WELLNESS R&B

## 2022-04-09 PROCEDURE — 36415 COLL VENOUS BLD VENIPUNCTURE: CPT

## 2022-04-09 RX ADMIN — VALPROIC ACID 500 MG: 250 SOLUTION ORAL at 21:33

## 2022-04-09 RX ADMIN — VALPROIC ACID 500 MG: 250 SOLUTION ORAL at 08:38

## 2022-04-09 RX ADMIN — PALIPERIDONE 6 MG: 6 TABLET, EXTENDED RELEASE ORAL at 08:38

## 2022-04-09 NOTE — GROUP NOTE
Group Therapy Note    Date: 4/9/2022    Group Start Time: 9589  Group End Time: 4432  Group Topic: Healthy Living/Wellness    [facility]    Lawrence Westfall        Group Therapy Note    Attendees: 6         Patient's Goal:  To discuss healthy coping skills by participating in a game    Notes:  Pt attended group for a brief period    Status After Intervention:  Unchanged    Participation Level:  Active Listener    Participation Quality: Appropriate      Speech:  normal      Thought Process/Content: Logical      Affective Functioning: Congruent      Mood: euthymic      Level of consciousness:  Alert      Response to Learning: Able to verbalize current knowledge/experience      Endings: None Reported    Modes of Intervention: Education, Socialization, Activity and Movement      Discipline Responsible: Behavorial Health Tech      Signature:  Lawrence Westfall

## 2022-04-09 NOTE — PROGRESS NOTES
Patient has been repetitive about his desire to be released. He tries to ask multiple plans about leaving trying to influence the decision. He denies SI, HI, or hallucinations. He discusses that he was [place]". He reports that is the second time in his life it has happened. He believes lack of sleep is the greatest precursor. Currently reports he has been talking to family and feels he would like to return to live in [place] but not return to college at this time. \"I need to be around my peers\" he states, not wanting to return to family. Patient still has boundary issues and had to be redirected from going into male peers room and sitting on the bed.

## 2022-04-09 NOTE — GROUP NOTE
Group Therapy Note    Date: 4/9/2022    Group Start Time: 1300  Group End Time: 056 2249  Group Topic: Healthy Living/Wellness    [facility]    Kateryna Hardwick RN        Group Therapy Note    Attendees: 9/13         Patient's Goal:  Socialization- able to interact appropriately with peers      Status After Intervention:  Unchanged    Participation Level:  Active Listener    Participation Quality: Attentive      Speech:  pressured      Thought Process/Content: Flight of ideas      Affective Functioning: Congruent      Mood: anxious      Level of consciousness:  Oriented x4      Response to Learning: Able to verbalize current knowledge/experience      Endings: None Reported    Modes of Intervention: Socialization      Discipline Responsible: Registered Nurse      Signature:  Kateryna Hardwick RN

## 2022-04-09 NOTE — PROGRESS NOTES
Dilan Urias [facility]. FOLLOW-UP NOTE       4/9/2022     Patient was seen and examined in person, Chart reviewed   Patient's case discussed with staff/team    Chief Complaint: Elizabeth, psychosis    Interim History:     Patient continues to be hypomanic; overall seems to be improving slowly. He reports he slept better and that his appetite is good. He is social with peers. Very discharge focused; wants to be discharged because he \"wants to play music with some guys\" and feels this is a unique opportunity for him, 'a chance to connect with them meaningfully'. He denied having suicidal or homicidal ideation. He denied having hallucinations, paranoia. He said his mood was \"much better\", and said he feels \"more at peace\". He said he is tolerating the medications well. Appetite:   [x] Normal/Unchanged  [] Increased  [] Decreased      Sleep:       [x] Normal/Unchanged  [] Fair       [] Poor              Energy:    [] Normal/Unchanged  [x] Increased  [] Decreased        SI [] Present  [x] Absent    HI  []Present  [x] Absent     Aggression:  [] yes  [x] no    Patient is [] able  [x] unable to CONTRACT FOR SAFETY     PAST MEDICAL/PSYCHIATRIC HISTORY:   No past medical history on file. FAMILY/SOCIAL HISTORY:  No family history on file.   Social History     Socioeconomic History    Marital status: Single     Spouse name: Not on file    Number of children: Not on file    Years of education: Not on file    Highest education level: Not on file   Occupational History    Not on file   Tobacco Use    Smoking status: Not on file    Smokeless tobacco: Not on file   Substance and Sexual Activity    Alcohol use: Not on file    Drug use: Not on file    Sexual activity: Not on file   Other Topics Concern    Not on file   Social History Narrative    Not on file     Social Determinants of Health     Financial Resource Strain:     Difficulty of Paying Living Expenses: Not on file   Food Insecurity:     injection 2 mg, 2 mg, IntraMUSCular, BID PRN, Vinetta Goodpasture, MD, 2 mg at 04/05/22 1354    magnesium hydroxide (MILK OF MAGNESIA) 400 MG/5ML suspension 30 mL, 30 mL, Oral, Daily PRN, Vinetta Goodpasture, MD    haloperidol lactate (HALDOL) injection 5 mg, 5 mg, IntraMUSCular, Q6H PRN **OR** haloperidol (HALDOL) tablet 5 mg, 5 mg, Oral, Q6H PRN, Vinetta Goodpasture, MD, 5 mg at 04/04/22 2345    hydrOXYzine (VISTARIL) capsule 50 mg, 50 mg, Oral, Q6H PRN, 50 mg at 04/06/22 1006 **OR** hydrOXYzine (VISTARIL) injection 50 mg, 50 mg, IntraMUSCular, Q6H PRN, Vinetta Goodpasture, MD    valproic acid (DEPAKENE) 250 MG/5ML oral solution 500 mg, 500 mg, Oral, BID, Vinetta Goodpasture, MD, 500 mg at 04/09/22 3204      Examination:  /80   Pulse 113   Temp 98.6 °F (37 °C)   Resp 18   SpO2 100%   Gait - steady  Medication side effects(SE): no    Mental Status Examination:    Level of consciousness:  within normal limits   Appearance:  fair grooming and fair hygiene  Behavior/Motor: less  psychomotor agitation  Attitude toward examiner:  playful  Speech:  Less rapid, hyperverbal and pressured   Mood: less labile  Affect:  mood congruent  Thought processes:  Mild FOI, able to be redirected  Thought content:    Suicidal ideation: denies  Homicidal ideation: denies  Delusions:  Less grandiose  Cognition:  oriented to person, place, and time   Concentration poor  Insight somewhat better  Judgement poor     ASSESSMENT:   Patient symptoms are:  [x] Well controlled  [x] Improving  [] Worsening  [] No change      Diagnosis:   Principal Problem:    Bipolar affective disorder, manic, severe, with psychotic behavior ([facility] [address]  Resolved Problems:    * No resolved hospital problems. *      LABS:    No results for input(s): WBC, HGB, PLT in the last 72 hours. No results for input(s): NA, K, CL, CO2, BUN, CREATININE, GLUCOSE in the last 72 hours. No results for input(s): BILITOT, ALKPHOS, AST, ALT in the last 72 hours.   Lab Results   Component Value Date    LABAMPH Neg 04/03/2022    BARBSCNU Neg 04/03/2022    LABBENZ Neg 04/03/2022    LABMETH Neg 04/03/2022    OPIATESCREENURINE Neg 04/03/2022    PHENCYCLIDINESCREENURINE Neg 04/03/2022    ETOH <10 04/03/2022     Lab Results   Component Value Date    TSH 2.590 04/03/2022     No results found for: LITHIUM  Lab Results   Component Value Date    VALPROATE 109.3 (H) 04/09/2022         Treatment Plan:  Reviewed current Medications with the patient. Medication as ordered  Depakote level 109.3; will continue current dose of Depakote since the patient is tolerating it well and the Depakote seems to be helpful in decreasing manic symptoms. Risks, benefits, side effects, drug-to-drug interactions and alternatives to treatment were discussed. Collateral information: pending  CD evaluation  Encourage patient to attend group and other milieu activities.   Discharge planning discussed with the patient and treatment team.    PSYCHOTHERAPY/COUNSELING:  [x] Therapeutic interview  [x] Supportive  [] CBT  [] Ongoing  [] Other    [x] Patient continues to need, on a daily basis, active treatment furnished directly by or requiring the supervision of inpatient psychiatric personnel      Anticipated Length of stay:            Electronically signed by Arvin Bermudez MD on 4/9/2022 at 3:07 PM

## 2022-04-09 NOTE — PROGRESS NOTES
When rounding pt noted to be running to his room pt then  seen on his knees facing the wall in a prayer pose, pt noted to be preoccupied with peer, walking the unit and noted to be in peers room seated on the bed together, both pt were educated on unit policy, pt verbalized understanding.

## 2022-04-09 NOTE — PROGRESS NOTES
Morning Community Meeting Topics    Sophie Rivera attended the morning community meeting on 4/9/22. Topics discussed today     [x] Introduction   Day of the week and date   Mask distribution   Current mask requirements  [x]Teams   Explanation of  Green and Blue team criteria   Nurses assigned to each team for today   Explanation about green and blue paper  o Date  o Patient's Name  o Patient's Nurse  o Goals  [x] Visitation   Announce the visiting hours for the day   Announce which team is allowed to have visitors for the day   Review any updated Covid 19 requirements for visitors during visitation  o Vaccine Card or negative Covid test within 48 hours of visit  o State Identification   Patients are reminded to alert the  at least 1 hour before visitation   [x] Unit Orientation   Coffee use   Phone location and etiquette   Shower locations  [employer] and dryer location and process   Common area expectations   Staff rounds expectation  [x] Meals    Educate patient to the menu  o The patient is encouraged to fill out the menu to get preferences at mealtime  o The patient is educated that if they do not fill out the menu, they will get the standard tray  o The coffee pot is decaf, patient encouraged to order regular coffee from menu.    Educate patient to the meal process   Patient encouraged to eat snacks provided twice daily  o Snacks may stay in patient room     [x] Discharge Process   Discharge expectations   Fill out the survey after discharge   [x] Hygiene   Daily showers encouraged  o Showers availability discussed    Daily dressing encouraged  o Discussed wearing street clothing   Education provided on where to place linens and clothing  o Linens in the hamper  o personal clothing does not go into the linen hamper  [x] Group    Patient encouraged to attend group provided   Time of Group Meetings discussed   Gentle reminder that attendance is a Physician order  [x] Movement   Chair exercises completed   Stretching completed  Notes: GOAL : \" to leave\" Electronically signed by Seng Miller on 4/9/2022 at 9:04 AM

## 2022-04-09 NOTE — GROUP NOTE
Group Therapy Note    Date: 4/8/2022    Group Start Time: 2050  Group End Time: 2115  Group Topic: Wrap-Up    [facility]    Victoriano Larios RN        Group Therapy Note    Attendees: 6/10         Patient's Goal:  To attend and participate in groups    Notes:  Pt participated    Status After Intervention:  Unchanged     Participation Level:  Active Listener    Participation Quality: Appropriate      Speech:  normal      Thought Process/Content: Logical      Affective Functioning: Congruent      Mood: euthymic      Level of consciousness:  Oriented x4      Response to Learning: Able to verbalize current knowledge/experience      Endings: None Reported    Modes of Intervention: Socialization      Discipline Responsible: Registered Nurse      Signature:  Victoriano Larios RN

## 2022-04-09 NOTE — PROGRESS NOTES
Patient is not suicidal or homicidal. He is in improved control compared to last week but continues to be hypomanic. He will ask for things repeatedly and persistently even if they are unattainable. He seems to display OCD characteristics. No evidence of hallucinations. Patient is grandiose. Patient was focused on being able to watch [facility] Sunday [employer] service on hospital TV, bringing himself up to the screen and appearing very intent.

## 2022-04-09 NOTE — GROUP NOTE
Group Therapy Note    Date: 4/9/2022    Group Start Time: 1000  Group End Time: 1100  Group Topic: Psychoeducation    [facility]    Venita Mensah, CTRS        Group Therapy Note    Attendees: 7         Patient's Goal:  \"to leave\"    Notes:  Pt. attended the 1000 skill group. Worked on project with interest. Easily engaged in the group discussion. Helpful with clean up. Status After Intervention:  Improved    Participation Level:  Active Listener and Interactive    Participation Quality: Appropriate, Attentive and Sharing      Speech:  normal      Thought Process/Content: Logical      Affective Functioning: Congruent      Mood: calm, happy      Level of consciousness:  Alert, Oriented x4 and Attentive      Response to Learning: Progressing to goal      Endings: None Reported    Modes of Intervention: Education, Support, Socialization and Activity      Discipline Responsible: Psychoeducational Specialist      Signature:  Shari Alvarez

## 2022-04-09 NOTE — GROUP NOTE
Group Therapy Note    Date: 4/9/2022    Group Start Time: 1100  Group End Time: 3232  Group Topic: Psychoeducation    [facility]    AURELIO Hunter LSW        Group Therapy Note    Attendees: 6         Patient's Goal: to participate in a Psychoeducational group therapy    Notes:  Patient participated in 10 healthy lifestyle activities    Status After Intervention:  Improved    Participation Level: Interactive    Participation Quality: Sharing      Speech:  normal      Thought Process/Content: Logical      Affective Functioning: Congruent      Mood: calm      Level of consciousness:  Alert      Response to Learning: Able to verbalize current knowledge/experience      Endings: None Reported    Modes of Intervention: Education      Discipline Responsible: /Counselor      Signature:  AURELIO Hunter, ZE

## 2022-04-10 PROCEDURE — 6370000000 HC RX 637 (ALT 250 FOR IP): Performed by: PSYCHIATRY & NEUROLOGY

## 2022-04-10 PROCEDURE — 1240000000 HC EMOTIONAL WELLNESS R&B

## 2022-04-10 RX ADMIN — VALPROIC ACID 500 MG: 250 SOLUTION ORAL at 08:31

## 2022-04-10 RX ADMIN — PALIPERIDONE 6 MG: 6 TABLET, EXTENDED RELEASE ORAL at 08:31

## 2022-04-10 RX ADMIN — VALPROIC ACID 500 MG: 250 SOLUTION ORAL at 20:53

## 2022-04-10 NOTE — PROGRESS NOTES
Patients mother arrived for visiting (family lives in [place]). Annia Gallegos got noticeably uncomfortable with her arrival. He spoke to her for about 2 minutes and patient reports telling her that it was not a good day for a visit. She exited the unit, and did not interact with staff.

## 2022-04-10 NOTE — GROUP NOTE
Group Therapy Note    Date: 4/9/2022    Group Start Time: 2000  Group End Time: 2045  Group Topic: Recreational    [facility]    Sandra Borden        Group Therapy Note    Attendees: 11         Patient's Goal:  To play wii CyberSenseling with peers    Notes:  Pt participated in game with peers    Status After Intervention:  Unchanged    Participation Level:  Active Listener and Interactive    Participation Quality: Appropriate and Attentive      Speech:  normal      Thought Process/Content: Logical      Affective Functioning: Congruent      Mood: euthymic      Level of consciousness:  Alert      Response to Learning: Able to verbalize current knowledge/experience      Endings: None Reported    Modes of Intervention: Socialization, Activity and Movement      Discipline Responsible: Behavorial Health Tech      Signature:  Sandra Borden

## 2022-04-10 NOTE — GROUP NOTE
Group Therapy Note    Date: 4/10/2022    Group Start Time: 1000  Group End Time: 1100  Group Topic: Psychoeducation    [facility]    Melva Haro, CTRS        Group Therapy Note    Attendees: 11         Patient's Goal:  **\"to be dc\"    Notes:  Pt. attended the 1000 skill group. Worked on project after many questions and seeking staff attention. Can be intrusive at times. Talkative. Status After Intervention:  Improved    Participation Level:  Active Listener and Interactive    Participation Quality: Appropriate, Attentive, Sharing and Intrusive      Speech:  normal      Thought Process/Content: Logical  Flight of ideas      Affective Functioning: Congruent      Mood: slightly elevated at times, calm      Level of consciousness:  Alert, Oriented x4, Attentive and Preoccupied      Response to Learning: Able to change behavior and Progressing to goal      Endings: None Reported    Modes of Intervention: Education, Support, Socialization and Activity      Discipline Responsible: Psychoeducational Specialist      Signature:  Anastacio Pérez

## 2022-04-10 NOTE — PROGRESS NOTES
Dilan Urias [facility]. FOLLOW-UP NOTE       4/10/2022     Patient was seen and examined in person, Chart reviewed   Patient's case discussed with staff/team    Chief Complaint: Elizabeth, psychosis    Interim History:     Patient continues to be hypomanic. He continues to be very focused on being discharged; talks about wanting to \"catch up on schoolwork\" since school starts tomorrow. I pointed out that he had stated (according to notes) that he did not want to return to school, but just stay in [place]; he said he had \"realized he should best return to school\". He said his sleep was \"OK\", and that he was 'up once during the night'. When asked about the episode described by staff where he had been found in the room with a book in the dark, he said he was 'writing in his journal' and that \"there was some light\" coming from under the sink and from the window. He said his appetite was 'good\", and he was \"also getting Ensure\". He denied feeling depressed. He denied having suicidal or homicidal ideation. He denied having any hallucinations, paranoia or other delusions. Appetite:   [x] Normal/Unchanged  [] Increased  [] Decreased      Sleep:       [x] Normal/Unchanged  [] Fair       [] Poor              Energy:    [] Normal/Unchanged  [x] Increased  [] Decreased        SI [] Present  [x] Absent    HI  []Present  [x] Absent     Aggression:  [] yes  [x] no    Patient is [] able  [x] unable to CONTRACT FOR SAFETY     PAST MEDICAL/PSYCHIATRIC HISTORY:   No past medical history on file. FAMILY/SOCIAL HISTORY:  No family history on file.   Social History     Socioeconomic History    Marital status: Single     Spouse name: Not on file    Number of children: Not on file    Years of education: Not on file    Highest education level: Not on file   Occupational History    Not on file   Tobacco Use    Smoking status: Not on file    Smokeless tobacco: Not on file   Substance and Sexual Activity    Alcohol use: Not on file    Drug use: Not on file    Sexual activity: Not on file   Other Topics Concern    Not on file   Social History Narrative    Not on file     Social Determinants of Health     Financial Resource Strain:     Difficulty of Paying Living Expenses: Not on file   Food Insecurity:     Worried About Running Out of Food in the Last Year: Not on file    Audrey of Food in the Last Year: Not on file   Transportation Needs:     Lack of Transportation (Medical): Not on file    Lack of Transportation (Non-Medical): Not on file   Physical Activity:     Days of Exercise per Week: Not on file    Minutes of Exercise per Session: Not on file   Stress:     Feeling of Stress : Not on file   Social Connections:     Frequency of Communication with Friends and Family: Not on file    Frequency of Social Gatherings with Friends and Family: Not on file    Attends Quaker Services: Not on file    Active Member of [address] [place] [address] [employer] or Organizations: Not on file    Attends Club or Organization Meetings: Not on file    Marital Status: Not on file   Intimate Partner Violence:     Fear of Current or Ex-Partner: Not on file    Emotionally Abused: Not on file    Physically Abused: Not on file    Sexually Abused: Not on file   Housing Stability:     Unable to Pay for Housing in the Last Year: Not on file    Number of [place] in the Last Year: Not on file    Unstable Housing in the Last Year: Not on file           ROS:  [x] All negative/unchanged except if checked.  Explain positive(checked items) below:  [] Constitutional  [] Eyes  [] Ear/Nose/Mouth/Throat  [] Respiratory  [] CV  [] GI  []   [] Musculoskeletal  [] Skin/Breast  [] Neurological  [] Endocrine  [] Heme/Lymph  [] Allergic/Immunologic    Explanation:     MEDICATIONS:    Current Facility-Administered Medications:     paliperidone (INVEGA) extended release tablet 6 mg, 6 mg, Oral, Daily, María Lr MD, 6 mg at 04/10/22 0831    LORazepam (ATIVAN) injection 1 mg, 1 mg, IntraMUSCular, Q6H PRN, Sidney Sommer MD    acetaminophen (TYLENOL) tablet 650 mg, 650 mg, Oral, Q4H PRN, Sidney Sommer MD    traZODone (DESYREL) tablet 50 mg, 50 mg, Oral, Nightly PRN, Sidney Sommer MD, 50 mg at 04/06/22 0237    benztropine mesylate (COGENTIN) injection 2 mg, 2 mg, IntraMUSCular, BID PRN, Sidney Sommer MD, 2 mg at 04/05/22 1354    magnesium hydroxide (MILK OF MAGNESIA) 400 MG/5ML suspension 30 mL, 30 mL, Oral, Daily PRN, Sidney Sommer MD    haloperidol lactate (HALDOL) injection 5 mg, 5 mg, IntraMUSCular, Q6H PRN **OR** haloperidol (HALDOL) tablet 5 mg, 5 mg, Oral, Q6H PRN, Sidney Sommer MD, 5 mg at 04/04/22 2345    hydrOXYzine (VISTARIL) capsule 50 mg, 50 mg, Oral, Q6H PRN, 50 mg at 04/06/22 1006 **OR** hydrOXYzine (VISTARIL) injection 50 mg, 50 mg, IntraMUSCular, Q6H PRN, Sidney Sommer MD    valproic acid (DEPAKENE) 250 MG/5ML oral solution 500 mg, 500 mg, Oral, BID, Sidney Sommer MD, 500 mg at 04/10/22 0831      Examination:  /78   Pulse 120   Temp 98.6 °F (37 °C) (Oral)   Resp 18   SpO2 100%   Gait - steady  Medication side effects(SE): no    Mental Status Examination:    Level of consciousness: within normal limits   Appearance: fair grooming and hygiene  Behavior/Motor: psychomotor restlessness  Attitude toward examiner:  playful  Speech:  Less rapid, hyperverbal and pressured   Mood: less labile  Affect:  mood congruent  Thought processes:  Mild FOI, able to be redirected  Thought content:    Suicidal ideation: denies  Homicidal ideation: denies  Delusions:  Less grandiose  Cognition:  oriented to person, place, and time   Concentration poor  Insight somewhat improved  Judgement poor     ASSESSMENT:   Patient symptoms are:  [x] Well controlled  [x] Improving  [] Worsening  [] No change      Diagnosis:   Principal Problem:    Bipolar affective disorder, manic, severe, with psychotic behavior ([facility] [address]  Resolved Problems:    * No resolved hospital problems. *      LABS:    No results for input(s): WBC, HGB, PLT in the last 72 hours. No results for input(s): NA, K, CL, CO2, BUN, CREATININE, GLUCOSE in the last 72 hours. No results for input(s): BILITOT, ALKPHOS, AST, ALT in the last 72 hours. Lab Results   Component Value Date    LABAMPH Neg 04/03/2022    BARBSCNU Neg 04/03/2022    LABBENZ Neg 04/03/2022    LABMETH Neg 04/03/2022    OPIATESCREENURINE Neg 04/03/2022    PHENCYCLIDINESCREENURINE Neg 04/03/2022    ETOH <10 04/03/2022     Lab Results   Component Value Date    TSH 2.590 04/03/2022     No results found for: LITHIUM  Lab Results   Component Value Date    VALPROATE 109.3 (H) 04/09/2022         Treatment Plan:  Reviewed current Medications with the patient. Medication as ordered  Depakote level 109.3; will continue current dose of Depakote since the patient is tolerating it well and the Depakote seems to be helpful in decreasing manic symptoms. Risks, benefits, side effects, drug-to-drug interactions and alternatives to treatment were discussed. Collateral information: pending  CD evaluation  Encourage patient to attend group and other milieu activities.   Discharge planning discussed with the patient and treatment team.    PSYCHOTHERAPY/COUNSELING:  [x] Therapeutic interview  [x] Supportive  [] CBT  [] Ongoing  [] Other    [x] Patient continues to need, on a daily basis, active treatment furnished directly by or requiring the supervision of inpatient psychiatric personnel      Anticipated Length of stay:            Electronically signed by Don Artis MD on 4/10/2022 at 3:27 PM

## 2022-04-10 NOTE — PROGRESS NOTES
Patient is social with peers and obsessing about getting his visitors on for 6 pm visiting. Discussed fact that he asked his mother to leave and now asked to have two friends visit. Patient expressed that this whole hospitalization is to try to distance himself from parents and he is trying to be independent. He denies SI, HI, or hallucinations. Decision making and concentration are both impaired. He remains grandiose with poor boundaries.

## 2022-04-10 NOTE — PROGRESS NOTES
Morning Community Meeting Topics    Lauren Muñoz attended the morning community meeting on 4/10/22. Topics discussed today     [x] Introduction   Day of the week and date   Mask distribution   Current mask requirements  [x]Teams   Explanation of  Green and Blue team criteria   Nurses assigned to each team for today   Explanation about green and blue paper  o Date  o Patient's Name  o Patient's Nurse  o Goals  [x] Visitation   Announce the visiting hours for the day   Announce which team is allowed to have visitors for the day   Review any updated Covid 19 requirements for visitors during visitation  o Vaccine Card or negative Covid test within 48 hours of visit  o State Identification   Patients are reminded to alert the  at least 1 hour before visitation   [x] Unit Orientation   Coffee use   Phone location and etiquette   Shower locations  [employer] and dryer location and process   Common area expectations   Staff rounds expectation  [x] Meals    Educate patient to the menu  o The patient is encouraged to fill out the menu to get preferences at mealtime  o The patient is educated that if they do not fill out the menu, they will get the standard tray  o The coffee pot is decaf, patient encouraged to order regular coffee from menu.    Educate patient to the meal process   Patient encouraged to eat snacks provided twice daily  o Snacks may stay in patient room     [x] Discharge Process   Discharge expectations   Fill out the survey after discharge   [x] Hygiene   Daily showers encouraged  o Showers availability discussed    Daily dressing encouraged  o Discussed wearing street clothing   Education provided on where to place linens and clothing  o Linens in the hamper  o personal clothing does not go into the linen hamper  [x] Group    Patient encouraged to attend group provided   Time of Group Meetings discussed   Gentle reminder that attendance is a Physician order  [x] Movement   Chair exercises completed   Stretching completed  Notes: GOAL : \" to be dc\" Electronically signed by Deana Valentin on 4/10/2022 at 9:33 AM  \

## 2022-04-10 NOTE — GROUP NOTE
Group Therapy Note    Date: 4/10/2022    Group Start Time: 1100  Group End Time: 4804  Group Topic: Group Therapy    [facility]    MASTER Dinh        Group Therapy Note    Attendees: 9         Patient's Goal:  To participate in a goal oriented group. 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 Status After Intervention:  Improved    Participation Level: Active Listener    Participation Quality: Appropriate      Speech:  normal      Thought Process/Content: Logical      Affective Functioning: Congruent      Mood: elevated      Level of consciousness:  Alert      Response to Learning: Able to verbalize current knowledge/experience      Endings: None Reported    Modes of Intervention: Education      Discipline Responsible: /Counselor      Signature:   MASTER Dinh

## 2022-04-10 NOTE — PROGRESS NOTES
Patient is social and talkative. He demonstrates poor physical space boundaries, at times standing in close proximity to staff or peers. He is repetitive and relentless to meet his needs. He denies SI, HI, or hallucinations. He is focused on going home.

## 2022-04-10 NOTE — PROGRESS NOTES
Pt noted to be sitting on edge of bed in completely dark room with reading materials in hands. Pt appears to be trying to read said materials. Pt encouraged to rest d/t current time and is agreeable.

## 2022-04-10 NOTE — GROUP NOTE
Group Therapy Note    Date: 4/10/2022    Group Start Time: 1600  Group End Time: 1661  Group Topic: Recreational    [facility]    Harrison Swenson RN        Group Therapy Note    Attendees: 10/15         Patient's Goal:  Socialization and support-learning how to socialize and support each other.     Status After Intervention:  Improved    Participation Level: Interactive    Participation Quality: Attentive and Intrusive      Speech:  pressured      Thought Process/Content: Logical      Affective Functioning: Congruent      Mood: euthymic      Level of consciousness:  Oriented x4      Response to Learning: Able to verbalize current knowledge/experience      Endings: None Reported    Modes of Intervention: Support and Socialization      Discipline Responsible: Registered Nurse      Signature:  Harrison Swenson RN

## 2022-04-10 NOTE — GROUP NOTE
Group Therapy Note    Date: 4/10/2022    Group Start Time: 1245  Group End Time: 6518  Group Topic: Healthy Living/Wellness    [facility]    Tamiko Henry        Group Therapy Note    Attendees: 9          Patient's Goal:  To attend group    Notes:  Pt was attentive     Status After Intervention:  Unchanged    Participation Level: Interactive    Participation Quality: Sharing      Speech:  pressured      Thought Process/Content: Logical      Affective Functioning: Congruent      Mood: euthymic      Level of consciousness:  Alert      Response to Learning: Able to verbalize current knowledge/experience      Endings: None Reported    Modes of Intervention: Activity      Discipline Responsible: PCA      Signature:   Bjorn Charles

## 2022-04-11 VITALS
TEMPERATURE: 97.7 F | SYSTOLIC BLOOD PRESSURE: 113 MMHG | HEART RATE: 106 BPM | OXYGEN SATURATION: 100 % | RESPIRATION RATE: 18 BRPM | DIASTOLIC BLOOD PRESSURE: 78 MMHG

## 2022-04-11 PROCEDURE — 90833 PSYTX W PT W E/M 30 MIN: CPT | Performed by: PSYCHIATRY & NEUROLOGY

## 2022-04-11 PROCEDURE — 6370000000 HC RX 637 (ALT 250 FOR IP): Performed by: PSYCHIATRY & NEUROLOGY

## 2022-04-11 PROCEDURE — 99232 SBSQ HOSP IP/OBS MODERATE 35: CPT | Performed by: PSYCHIATRY & NEUROLOGY

## 2022-04-11 PROCEDURE — 1240000000 HC EMOTIONAL WELLNESS R&B

## 2022-04-11 RX ADMIN — PALIPERIDONE 6 MG: 6 TABLET, EXTENDED RELEASE ORAL at 08:30

## 2022-04-11 RX ADMIN — VALPROIC ACID 500 MG: 250 SOLUTION ORAL at 08:29

## 2022-04-11 RX ADMIN — VALPROIC ACID 500 MG: 250 SOLUTION ORAL at 21:41

## 2022-04-11 NOTE — PROGRESS NOTES
Patient has been visible on the unit today-- pleasant with peers ./ no intrusive behavioral noted. He remains preoccupied with discharge but asking appropriate questions. He would like to have discharge expedited but after discussing how we p[kory for a discharge - he was able to state that he should just relax and wait for the plans to get done. Attending groups today. At times found reading the Bible / no voiced psychotic symptoms but thoughts are slightly disorganized at times. Over hear speaking with Mother on phone this afternoon.

## 2022-04-11 NOTE — CARE COORDINATION
Writer received email from pt's mother on 4/8/22 at [address],    I know you cant write anything back by email - just wanted to communicate this in the few minutes I have between clients:  Dr. Phillip Adhikari, who was Talats psychiatrist in [facility], told me last night that neither she nor her colleague Grace Reyes, feel competent to handle Talat for follow up, so he will need a new psychiatrist. Not a huge loss for us, since she diagnosed him with OCD and treated him with Zoloft, which probably exacerbated his krys, but Landy Prince liked her and this is likely to be another loss for him. As Nishant Nicolas may have told you, his housemates also feel he is too much for them and would like him to move. I dont think he knows about either of these situations, but I think it would be good for him to be told while hes there and given a chance to process all this with you. Roma Crystal at [place] is aware of the housing situation and will work with [employer] and with the housemates (He says that it is actually up to [employer] to move if he wants - he doesnt have to).     Thanks for all your help,  Mele Ballesteros

## 2022-04-11 NOTE — CARE COORDINATION
FAMILY COLLATERAL NOTE    Family/Support Name: Dr. Lubna Pack #: [phone]  Relationship to Pt: psycho therapist      Placed call to above to return message left for SW. No answer. Left message with return information.      Response:  No answer, SHYLA Lieberman, [facility]

## 2022-04-11 NOTE — CARE COORDINATION
FAMILY COLLATERAL NOTE    Family/Support Name: Dr. Wilmer Wolf #: [phone]  Relationship to Pt: psychotherapist       Doctor called to ask for an update on patient. Made her aware of discharge plans. She will continue to work with patient. She would like to call patient today at 4:30pm to speak to him.              [facility]

## 2022-04-11 NOTE — GROUP NOTE
Group Therapy Note    Date: 4/11/2022    Group Start Time: 1630  Group End Time: 1700  Group Topic: Healthy Living/Wellness    [facility] SRINI Mccord        Group Therapy Note    Attendees: 6/12         Patient's Goal:  To learn how coping skills can affect our mood. Notes:  Patient participated in group.      Status After Intervention:  Unchanged    Participation Level: Interactive    Participation Quality: Appropriate and Attentive      Speech:  normal      Thought Process/Content: Logical      Affective Functioning: Congruent      Mood: euthymic      Level of consciousness:  Alert and Attentive      Response to Learning: Progressing to goal      Endings: None Reported    Modes of Intervention: Education      Discipline Responsible: [address] [employer]      Signature:  Luis Mccord

## 2022-04-11 NOTE — CARE COORDINATION
FAMILY COLLATERAL NOTE    Family/Support Name: Bernice Zamora  Contact #: [phone]  Relationship to Pt: [employer]      Placed call to above and left message requesting a return call to update regarding discharge plan.      Response:  No answer, Left message        Rossana Zelaya, [facility]

## 2022-04-11 NOTE — PROGRESS NOTES
Pt assessed at the nurses station. Pt appears flat and is discharged focused. Pt approached the nurses station 3 times requesting we being the discharge process tonight so that he's off the unit by 9am.  Pt was polite and cooperative. Pt stated he has an appointment with his counselor at [address] [place] [address] so he needs to be discharges by 8am.  Pt advised that discharges will begin after the dr puts in the order in the morning. Pt denies SI/HI/AVH, anxiety and depression.

## 2022-04-11 NOTE — GROUP NOTE
Group Therapy Note    Date: 4/11/2022    Group Start Time: 1100  Group End Time: 1140  Group Topic: Psychoeducation    [facility]    AURELIO Khan LSW        Group Therapy Note    Attendees: 7         Patient's Goal:  To participate in Psychoeducational group    Notes:  Patient participated in \"check in\"  He is feeling hopefull today and plans [facility] after discharge.      Status After Intervention:  Improved    Participation Level: Interactive    Participation Quality: Sharing      Speech:  normal      Thought Process/Content: Logical      Affective Functioning: Congruent      Mood: calm      Level of consciousness:  Alert      Response to Learning: Able to verbalize current knowledge/experience      Endings: None Reported    Modes of Intervention: Education      Discipline Responsible: /Counselor      Signature:  AURELIO Khan LSW

## 2022-04-11 NOTE — GROUP NOTE
Group Therapy Note    Date: 4/10/2022    Group Start Time: 2115  Group End Time: 2132  Group Topic: Wrap-Up    [facility]    Caro Whitley RN        Group Therapy Note    Attendees: 9         Patient's Goal:  \"to be present\"    Notes:  Pt reports goal in progress.      Status After Intervention:  Unchanged    Participation Level: Monopolizing    Participation Quality: Attentive, Supportive and Intrusive      Speech:  normal      Thought Process/Content: Flight of ideas      Affective Functioning: Flat      Mood: euthymic      Level of consciousness:  Alert, Attentive and Preoccupied      Response to Learning: Able to retain information      Endings: None Reported    Modes of Intervention: Exploration      Discipline Responsible: Registered Nurse      Signature:  Caro Whitley RN

## 2022-04-11 NOTE — PROGRESS NOTES
Morning Community Meeting Topics    Cliff Jones attended the morning community meeting on 4/11/22. Topics discussed today     [x] Introduction   Day of the week and date   Mask distribution   Current mask requirements  [x]Teams   Explanation of  Green and Blue team criteria   Nurses assigned to each team for today   Explanation about green and blue paper  o Date  o Patient's Name  o Patient's Nurse  o Goals  [x] Visitation   Announce the visiting hours for the day   Announce which team is allowed to have visitors for the day   Review any updated Covid 19 requirements for visitors during visitation  o Vaccine Card or negative Covid test within 48 hours of visit  o State Identification   Patients are reminded to alert the  at least 1 hour before visitation   [x] Unit Orientation   Coffee use   Phone location and etiquette   Shower locations  [employer] and dryer location and process   Common area expectations   Staff rounds expectation  [x] Meals    Educate patient to the menu  o The patient is encouraged to fill out the menu to get preferences at mealtime  o The patient is educated that if they do not fill out the menu, they will get the standard tray  o The coffee pot is decaf, patient encouraged to order regular coffee from menu.    Educate patient to the meal process   Patient encouraged to eat snacks provided twice daily  o Snacks may stay in patient room     [x] Discharge Process   Discharge expectations   Fill out the survey after discharge   [x] Hygiene   Daily showers encouraged  o Showers availability discussed    Daily dressing encouraged  o Discussed wearing street clothing   Education provided on where to place linens and clothing  o Linens in the hamper  o personal clothing does not go into the linen hamper  [x] Group    Patient encouraged to attend group provided   Time of Group Meetings discussed   Gentle reminder that attendance is a Physician order  [x] Movement   Chair exercises completed   Stretching completed  Notes:Goal - \"To go home and have a discharge plan\" Electronically signed by Zoraida Quick, [address] on 4/11/2022 at 9:52 AM

## 2022-04-11 NOTE — CARE COORDINATION
Spoke to patients mom to update her that patient is being discharged tomorrow and has agreed to take a least a week off of school and go back to [place] with his parents. Mom stated that she was surprised to hear this because patient is not comfortable staying with his parents. Mom stated that the  [employer] is working with patient and his roommates who had previous stated they are unsure if they want to have patient living with them. [employer] stated if patient wants to come back to that housing he is able to and [employer] will work with the roommates. Mom stated that patient needs to be aware of that and also aware that his psychiatrist is unable to take him back. Mom wants patient told these things while he is still in the hospital.   Made mom aware that patient will have outpatient apptmts at either [facility] or [facility] before he is discharged. Doctor is recommending IOP but would need to be in the evening. Mom will call back with a time they can pick patient up. This worker will call and speak to [facility] regarding the plan.

## 2022-04-11 NOTE — GROUP NOTE
Group Therapy Note    Date: 4/11/2022    Group Start Time: 1005  Group End Time: 1050  Group Topic: Psychoeducation    [facility] SRINI Pink        Group Therapy Note    Attendees: 8         Patient's Goal:  \"To go home and have a discharge plan\"    Notes:  Patient attended the 1000 skills group. Patient was calm and more focused on his project. He did enjoy socializing with his peers.     Status After Intervention:  Improved    Participation Level: Fair    Participation Quality: Appropriate      Speech:  Talkative      Thought Process/Content: Linear      Affective Functioning: Improved      Mood: calm      Level of consciousness:  Alert      Response to Learning: Progressing to goal      Endings: None Reported    Modes of Intervention: Education, Socialization and Activity      Discipline Responsible: Psychoeducational Specialist      Signature:  Marlo Pink

## 2022-04-11 NOTE — PROGRESS NOTES
Dilan Urias gabriel 89. FOLLOW-UP NOTE       4/11/2022     Patient was seen and examined in person, Chart reviewed   Patient's case discussed with staff/team    Chief Complaint: Elizabeth    Interim History:     Pt was hypomanic over the weekend. Better this morning  Pt want to return back to school after taking few days off  Did not talk to his mom over the weekend  Pt thinking if he want to stay here or go home for a week before rejoining school  Pt report that he made a mistake estranging from his family. Pt trying to move out as adult and struggling to find a balance between getting support and being independent  Sleep better  Has not spoken with his school since last week  Has been journaling his day to day thinking  More insightful into his illness and want to take things slowly    Appetite:   [x] Normal/Unchanged  [] Increased  [] Decreased      Sleep:       [] Normal/Unchanged  [x] Fair       [] Poor              Energy:    [x] Normal/Unchanged  [] Increased  [] Decreased        SI [] Present  [x] Absent    HI  []Present  [x] Absent     Aggression:  [] yes  [x] no    Patient is [x] able  [] unable to CONTRACT FOR SAFETY     PAST MEDICAL/PSYCHIATRIC HISTORY:   No past medical history on file. FAMILY/SOCIAL HISTORY:  No family history on file.   Social History     Socioeconomic History    Marital status: Single     Spouse name: Not on file    Number of children: Not on file    Years of education: Not on file    Highest education level: Not on file   Occupational History    Not on file   Tobacco Use    Smoking status: Not on file    Smokeless tobacco: Not on file   Substance and Sexual Activity    Alcohol use: Not on file    Drug use: Not on file    Sexual activity: Not on file   Other Topics Concern    Not on file   Social History Narrative    Not on file     Social Determinants of Health     Financial Resource Strain:     Difficulty of Paying Living Expenses: Not on file Food Insecurity:     Worried About Running Out of Food in the Last Year: Not on file    Audrey of Food in the Last Year: Not on file   Transportation Needs:     Lack of Transportation (Medical): Not on file    Lack of Transportation (Non-Medical): Not on file   Physical Activity:     Days of Exercise per Week: Not on file    Minutes of Exercise per Session: Not on file   Stress:     Feeling of Stress : Not on file   Social Connections:     Frequency of Communication with Friends and Family: Not on file    Frequency of Social Gatherings with Friends and Family: Not on file    Attends [employer] Services: Not on file    Active Member of [address] [place] [address] [employer] or Organizations: Not on file    Attends Club or Organization Meetings: Not on file    Marital Status: Not on file   Intimate Partner Violence:     Fear of Current or Ex-Partner: Not on file    Emotionally Abused: Not on file    Physically Abused: Not on file    Sexually Abused: Not on file   Housing Stability:     Unable to Pay for Housing in the Last Year: Not on file    Number of [place] in the Last Year: Not on file    Unstable Housing in the Last Year: Not on file           ROS:  [x] All negative/unchanged except if checked.  Explain positive(checked items) below:  [] Constitutional  [] Eyes  [] Ear/Nose/Mouth/Throat  [] Respiratory  [] CV  [] GI  []   [] Musculoskeletal  [] Skin/Breast  [] Neurological  [] Endocrine  [] Heme/Lymph  [] Allergic/Immunologic    Explanation:     MEDICATIONS:    Current Facility-Administered Medications:     paliperidone (INVEGA) extended release tablet 6 mg, 6 mg, Oral, Daily, Carlos Palacio MD, 6 mg at 04/11/22 0830    LORazepam (ATIVAN) injection 1 mg, 1 mg, IntraMUSCular, Q6H PRN, Carlos Palacio MD    acetaminophen (TYLENOL) tablet 650 mg, 650 mg, Oral, Q4H PRN, Carlos Palacio MD    traZODone (DESYREL) tablet 50 mg, 50 mg, Oral, Nightly PRN, Carlos Palacio MD, 50 mg at 04/06/22 0237    benztropine mesylate (COGENTIN) injection 2 mg, 2 mg, IntraMUSCular, BID PRN, Makayla Diaz MD, 2 mg at 04/05/22 1354    magnesium hydroxide (MILK OF MAGNESIA) 400 MG/5ML suspension 30 mL, 30 mL, Oral, Daily PRN, Makayla Diaz MD    haloperidol lactate (HALDOL) injection 5 mg, 5 mg, IntraMUSCular, Q6H PRN **OR** haloperidol (HALDOL) tablet 5 mg, 5 mg, Oral, Q6H PRN, Makayla Diaz MD, 5 mg at 04/04/22 2345    hydrOXYzine (VISTARIL) capsule 50 mg, 50 mg, Oral, Q6H PRN, 50 mg at 04/06/22 1006 **OR** hydrOXYzine (VISTARIL) injection 50 mg, 50 mg, IntraMUSCular, Q6H PRN, Makayla Diaz MD    valproic acid (DEPAKENE) 250 MG/5ML oral solution 500 mg, 500 mg, Oral, BID, Makayla Diaz MD, 500 mg at 04/11/22 0934      Examination:  /78   Pulse 106   Temp 97.7 °F (36.5 °C) (Oral)   Resp 18   SpO2 100%   Gait - steady  Medication side effects(SE): no    Mental Status Examination:    Level of consciousness:  within normal limits   Appearance:  fair grooming and fair hygiene  Behavior/Motor:  no abnormalities noted  Attitude toward examiner:  attentive  Speech:  Less rapid   Mood: anxious  Affect:  mood congruent  Thought processes:  coherent   Thought content:  Suicidal Ideation:  denies suicidal ideation  Delusions:  no evidence of delusions  Perceptual Disturbance:  denies any perceptual disturbance  Cognition:  oriented to person, place, and time   Concentration intact  Insight fair   Judgement fair     ASSESSMENT:   Patient symptoms are:  [] Well controlled  [] Improving  [] Worsening  [] No change      Diagnosis:   Principal Problem:    Bipolar affective disorder, manic, severe, with psychotic behavior ([facility] [address]  Resolved Problems:    * No resolved hospital problems. *      LABS:    No results for input(s): WBC, HGB, PLT in the last 72 hours. No results for input(s): NA, K, CL, CO2, BUN, CREATININE, GLUCOSE in the last 72 hours.   No results for input(s): BILITOT, ALKPHOS, AST, ALT in the last 72 hours. Lab Results   Component Value Date    LABAMPH Neg 04/03/2022    BARBSCNU Neg 04/03/2022    LABBENZ Neg 04/03/2022    LABMETH Neg 04/03/2022    OPIATESCREENURINE Neg 04/03/2022    PHENCYCLIDINESCREENURINE Neg 04/03/2022    ETOH <10 04/03/2022     Lab Results   Component Value Date    TSH 2.590 04/03/2022     No results found for: LITHIUM  Lab Results   Component Value Date    VALPROATE 109.3 (H) 04/09/2022       Treatment Plan:  Reviewed current Medications with the patient. Medication as ordered  Meeting with college and parent to come up with plan today, including possible IOP on discharge and taking some time off from school, returning to [place]  Risks, benefits, side effects, drug-to-drug interactions and alternatives to treatment were discussed. Collateral information: reviewed  CD evaluation  Encourage patient to attend group and other milieu activities. Discharge planning discussed with the patient and treatment team.    PSYCHOTHERAPY/COUNSELING:  [x] Therapeutic interview  [x] Supportive  [] CBT  [] Ongoing  [] Other  Patient was seen 1:1 for 20 minutes, other than E&M time spent, focusing on      - coping skills techniques     - Anxiety management techniques discussed including deep breathing exercise and PMR     - discussing patients strength and weakness      - Motivational interviewing to assess the stage of change and assessing patient readiness to quit substance use.      - Focusing on negative cognition and maladaptive thoughts, which is feeding and maintaining the depression symptoms        [x] Patient continues to need, on a daily basis, active treatment furnished directly by or requiring the supervision of inpatient psychiatric personnel      Anticipated Length of stay:            Electronically signed by Opal Aldrich MD on 4/11/2022 at 9:57 AM

## 2022-04-11 NOTE — CARE COORDINATION
FAMILY COLLATERAL NOTE    Family/Support Name: Anthony Paniagua. Contact #: [phone]  Relationship to Pt: father      Placed call to above. Father stated that he can pick patient up between 2:30-3pm tomorrow. Father said patients relationship with both parents are conflictual and he is wondering if it was explored with patient how he is feeling about coming home or any concerns that he has about coming home. Dad said it is not enough just to let patient agree to come home without exploring it more. This worker will try to set up an apptmt with the luci at 3:30-4 tomorrow. Dad would like to be called tomorrow before he picks patient up to go over his outpt apptmts and also let him know when the luci will be meeting with them.              Sebastián Bowman, [facility]

## 2022-04-11 NOTE — GROUP NOTE
Group Therapy Note    Date: 4/11/2022    Group Start Time: 1300  Group End Time: 6833  Group Topic: Healthy Living/Wellness    [facility]    Lucita Falcon RN        Group Therapy Note    Attendees: 6         Patient's Goal:  To learn about after visit summary and follow up care    Notes:  Pt actively and appropriately participated in discussion. Status After Intervention:  Unchanged    Participation Level:  Active Listener and Interactive    Participation Quality: Appropriate, Attentive, Sharing and Supportive      Speech:  normal      Thought Process/Content: Logical  Linear      Affective Functioning: Congruent      Mood: euthymic      Level of consciousness:  Alert and Oriented x4      Response to Learning: Able to verbalize current knowledge/experience, Able to verbalize/acknowledge new learning and Able to retain information      Endings: None Reported    Modes of Intervention: Education, Support, Socialization and Exploration      Discipline Responsible: Registered Nurse      Signature:  Lucita Falcon RN

## 2022-04-12 PROCEDURE — 6370000000 HC RX 637 (ALT 250 FOR IP): Performed by: PSYCHIATRY & NEUROLOGY

## 2022-04-12 PROCEDURE — 99239 HOSP IP/OBS DSCHRG MGMT >30: CPT | Performed by: PSYCHIATRY & NEUROLOGY

## 2022-04-12 RX ORDER — VALPROIC ACID 250 MG/5ML
500 SOLUTION ORAL 2 TIMES DAILY
Qty: 300 ML | Refills: 2 | Status: SHIPPED | OUTPATIENT
Start: 2022-04-12

## 2022-04-12 RX ORDER — PALIPERIDONE 6 MG/1
6 TABLET, EXTENDED RELEASE ORAL DAILY
Qty: 15 TABLET | Refills: 3 | Status: SHIPPED | OUTPATIENT
Start: 2022-04-13

## 2022-04-12 RX ADMIN — VALPROIC ACID 500 MG: 250 SOLUTION ORAL at 08:28

## 2022-04-12 RX ADMIN — PALIPERIDONE 6 MG: 6 TABLET, EXTENDED RELEASE ORAL at 08:28

## 2022-04-12 NOTE — CARE COORDINATION
Spoke with Sun Ruano, Director of [address] [facility]  # [phone] from [facility]. She is taking over the case from Brian Cruz. Updated Sun Ruano on the discharge plan. She stated that patient has left her 6 VM's this am. Sun Ruano will schedule an apptmt for tomorrow at 10:30 on zoom to speak with patient. Patient also has the option of calling her at this time if he does not want to do zoom. Made patient aware of this apptmt. Patient was in the process of calling Marci for the 7th time this am. He hung up the phone.

## 2022-04-12 NOTE — GROUP NOTE
Group Therapy Note    Date: 4/12/2022    Group Start Time: 0200  Group End Time: 0230  Group Topic: Cognitive Skills    [facility] 3[facility] I    EMELINA Giles        Group Therapy Note    Attendees: 3/16         Patient's Goal:  \"to participate in progressive muscle relaxation. \"    Notes:  Engaged     Status After Intervention:  Improved    Participation Level:  Active Listener and Interactive    Participation Quality: Appropriate, Attentive, Sharing and Supportive      Speech:  normal      Thought Process/Content: Logical      Affective Functioning: Flat      Mood: anxious      Level of consciousness:  Alert      Response to Learning: Progressing to goal      Endings: None Reported    Modes of Intervention: Education      Discipline Responsible: /Counselor      Signature:  EMELINA Giles

## 2022-04-12 NOTE — GROUP NOTE
Group Therapy Note    Date: 4/11/2022    Group Start Time: 2050  Group End Time: 2120  Group Topic: Recreational    [facility]    Tessy Cunningham        Group Therapy Note    Attendees: 8/15         Patient's Goal:  To play Wii Bowling with the group. Notes:  Patient played the game with peers.      Status After Intervention:  Unchanged    Participation Level: Interactive    Participation Quality: Appropriate and Attentive      Speech:  normal      Thought Process/Content: Logical      Affective Functioning: Flat      Mood: euthymic      Level of consciousness:  Alert and Attentive      Response to Learning: Progressing to goal      Endings: None Reported    Modes of Intervention: Activity      Discipline Responsible: [address]      Signature:  Tessy Cunningham

## 2022-04-12 NOTE — PROGRESS NOTES
Morning Community Meeting Topics    Izzye Goldberg attended the morning community meeting on 4/12/22. Topics discussed today     [x] Introduction   Day of the week and date   Mask distribution   Current mask requirements  [x]Teams   Explanation of  Green and Blue team criteria   Nurses assigned to each team for today   Explanation about green and blue paper  o Date  o Patient's Name  o Patient's Nurse  o Goals  [x] Visitation   Announce the visiting hours for the day   Announce which team is allowed to have visitors for the day   Review any updated Covid 19 requirements for visitors during visitation  o Vaccine Card or negative Covid test within 48 hours of visit  o State Identification   Patients are reminded to alert the  at least 1 hour before visitation   [x] Unit Orientation   Coffee use   Phone location and etiquette   Shower locations  [employer] and dryer location and process   Common area expectations   Staff rounds expectation  [x] Meals    Educate patient to the menu  o The patient is encouraged to fill out the menu to get preferences at mealtime  o The patient is educated that if they do not fill out the menu, they will get the standard tray  o The coffee pot is decaf, patient encouraged to order regular coffee from menu.    Educate patient to the meal process   Patient encouraged to eat snacks provided twice daily  o Snacks may stay in patient room     [x] Discharge Process   Discharge expectations   Fill out the survey after discharge   [x] Hygiene   Daily showers encouraged  o Showers availability discussed    Daily dressing encouraged  o Discussed wearing street clothing   Education provided on where to place linens and clothing  o Linens in the hamper  o personal clothing does not go into the linen hamper  [x] Group    Patient encouraged to attend group provided   Time of Group Meetings discussed   Gentle reminder that attendance is a Physician order  [x] Movement   Chair exercises completed   Stretching completed  Notes:Goal - \"To go home\" Electronically signed by CINDY Bellamy on 4/12/2022 at 9:37 AM

## 2022-04-12 NOTE — GROUP NOTE
Group Therapy Note    Date: 4/11/2022    Group Start Time: 2120  Group End Time: 2130  Group Topic: Wrap-Up    [facility]    Richrd Knock        Group Therapy Note    Attendees: 8/15         Patient's Goal:  \"try to be present and work on a discharge plan\"    Notes:  Patient reported meeting their goal for the day. Patient shared he is looking forward to discharge tomorrow.     Status After Intervention:  Unchanged    Participation Level: Interactive    Participation Quality: Appropriate, Attentive and Sharing      Speech:  normal      Thought Process/Content: Perseverating      Affective Functioning: Flat      Mood: euthymic      Level of consciousness:  Alert and Attentive      Response to Learning: Progressing to goal      Endings: None Reported    Modes of Intervention: Support      Discipline Responsible: [employer]      Signature:  Selene Travis

## 2022-04-12 NOTE — GROUP NOTE
Group Therapy Note    Date: 4/12/2022    Group Start Time: 1300  Group End Time: 0159  Group Topic: Cognitive Skills    ML 3W I    Sydney Reese RN        Group Therapy Note    Attendees: 8         Patient's Goal:  To learn how to say no and set boundaries. Status After Intervention:  Improved    Participation Level:  Active Listener and Minimal    Participation Quality: Appropriate and Attentive      Speech:  normal      Thought Process/Content: Logical  Linear      Affective Functioning: Flat      Mood: euthymic      Level of consciousness:  Alert, Oriented x4 and Attentive      Response to Learning: Able to retain information and Progressing to goal      Endings: None Reported    Modes of Intervention: Education and Support      Discipline Responsible: Registered Nurse      Signature:  Sydney Reese RN

## 2022-04-12 NOTE — DISCHARGE SUMMARY
DISCHARGE SUMMARY      Patient ID:  Stephania Mccarty  [id number]  54 y.o.  2000      Admit date: 4/3/2022    Discharge date and time: 4/12/2022    Admitting Physician: Blas Bonilla MD     Discharge Physician: Dr Mert Leary MD    Admission Diagnoses: Psychosis, unspecified psychosis type [facility]) [F29]    Admission Condition: poor    Discharged Condition: stable    Admission Circumstance: The patient is a 25 y.o. male with no significant past history of mental illness     ER report:     Patient presents to the ED from [facility]. Upon arrival patient was non-verbal. Patient then talked in triage but was disorganized, confused, delayed and thought blocking. Patient was pleasant during assessment but unable to answer questions appropriately when asked. Patient talks in circles and does not answer asked questions. Patient is discharge focused, wanting to leave by 8am for a program at [facility]. Patient is Patient denies SI, HI, AVH. Patient is paranoid of staff and medications administered to him. When asked about alcohol and illegal drugs he states, \"I look like a stoner don't I\". Denies ETOH use.      During interview:     Pt appeared disheveled, manic and psychotic with disorganized thinking  Pt present with grandiose thinking with poor insight and Judgment  Unable to get any coherent account from patient  Demanding discharge from hospital     Received phone call from [facility] at [facility]. No LUANA signed so no information was given. Did not confirm or deny pt's presence at hospital. [facility] wanted to provide information. No informed was given to [facility].      Marci [phone]/ [employer], [employer] which is a clearing house for reports made by students and faculty regarding students mental health concerns.      Reports only receiving less than 5 \"share reports\" which is an incident report that is submited via online system.  Reports these were completed by 3 friends, 1 friend who did a walk in and 1 was completed by \"[employer]\" reports pt was not making sense so he was not able to go due to pt not making sense. Reports that he left her a message and he \"made no sense. \" Reports pt was not eating. Reports pt was eating out of garbage cans. Reports pt has swayed on his feet due to not eating. Reports that pt would constantly say he is fat. Reports that pt was on a leave for an entire year/2 semester medical leave. Unsure if pt received treatment for eating disorder at this time. Reports pt meets with psychoanalyst and this is his support system. Reports this therapy is very \"freudian\" and not \"modern day therapy. \" reports she received text from a friend and everyone is concerned about him and pt not eating. Reports faculty did say he was going to class and participating. Reports pt has been struggling with his behaviors in private.        The patient is not currently receiving care for the above psychiatric illness.     Medications Prior to Admission:     Prescriptions Prior to Admission   Medications Prior to Admission: sertraline (ZOLOFT) 50 MG tablet, Take 75 mg by mouth daily Dose just changed from 50 mg to 75 mg, patient did not  yet. ([facility]). clonazePAM (KLONOPIN) 0.5 MG tablet, Take 0.25 mg by mouth 2 times daily as needed. Patient did not  from pharmacy yet ([facility])        Compliance:no     Psychiatric Review of Systems       Depression: no     Elizabeth or Hypomania:  yes      Panic Attacks:  no     Phobias:  no     Obsessions and Compulsions:  no     PTSD : no     Hallucinations:  yes      Delusions:  yes      Substance Abuse History:  Unable to elicit details  Utox negative      Past Psychiatric History:  Denies any past illness      PAST MEDICAL/PSYCHIATRIC HISTORY:   No past medical history on file. FAMILY/SOCIAL HISTORY:  No family history on file.   Social History     Socioeconomic History    Marital status: Single     Spouse name: Not on file    Number of children: Not on file    Years of education: Not on file    Highest education level: Not on file   Occupational History    Not on file   Tobacco Use    Smoking status: Not on file    Smokeless tobacco: Not on file   Substance and Sexual Activity    Alcohol use: Not on file    Drug use: Not on file    Sexual activity: Not on file   Other Topics Concern    Not on file   Social History Narrative    Not on file     Social Determinants of Health     Financial Resource Strain:     Difficulty of Paying Living Expenses: Not on file   Food Insecurity:     Worried About Running Out of Food in the Last Year: Not on file    Audrey of Food in the Last Year: Not on file   Transportation Needs:     Lack of Transportation (Medical): Not on file    Lack of Transportation (Non-Medical):  Not on file   Physical Activity:     Days of Exercise per Week: Not on file    Minutes of Exercise per Session: Not on file   Stress:     Feeling of Stress : Not on file   Social Connections:     Frequency of Communication with Friends and Family: Not on file    Frequency of Social Gatherings with Friends and Family: Not on file    Attends Mosque Services: Not on file    Active Member of [address] [place] [address] or Organizations: Not on file    Attends Club or Organization Meetings: Not on file    Marital Status: Not on file   Intimate Partner Violence:     Fear of Current or Ex-Partner: Not on file    Emotionally Abused: Not on file    Physically Abused: Not on file    Sexually Abused: Not on file   Housing Stability:     Unable to Pay for Housing in the Last Year: Not on file    Number of [place] in the Last Year: Not on file    Unstable Housing in the Last Year: Not on file       MEDICATIONS:    Current Facility-Administered Medications:     paliperidone (INVEGA) extended release tablet 6 mg, 6 mg, Oral, Daily, Dilshad Sharma MD, 6 mg at 04/12/22 0828    LORazepam (ATIVAN) injection 1 mg, 1 mg, IntraMUSCular, Q6H PRN, Letitia Borden MD    acetaminophen (TYLENOL) tablet 650 mg, 650 mg, Oral, Q4H PRN, Letitia Borden MD    traZODone (DESYREL) tablet 50 mg, 50 mg, Oral, Nightly PRN, Letitia Borden MD, 50 mg at 04/06/22 0237    benztropine mesylate (COGENTIN) injection 2 mg, 2 mg, IntraMUSCular, BID PRN, Letitia Borden MD, 2 mg at 04/05/22 1354    magnesium hydroxide (MILK OF MAGNESIA) 400 MG/5ML suspension 30 mL, 30 mL, Oral, Daily PRN, Letitia Borden MD    haloperidol lactate (HALDOL) injection 5 mg, 5 mg, IntraMUSCular, Q6H PRN **OR** haloperidol (HALDOL) tablet 5 mg, 5 mg, Oral, Q6H PRN, Letitia Borden MD, 5 mg at 04/04/22 2345    hydrOXYzine (VISTARIL) capsule 50 mg, 50 mg, Oral, Q6H PRN, 50 mg at 04/06/22 1006 **OR** hydrOXYzine (VISTARIL) injection 50 mg, 50 mg, IntraMUSCular, Q6H PRN, Letitia Borden MD    valproic acid (DEPAKENE) 250 MG/5ML oral solution 500 mg, 500 mg, Oral, BID, Letitia Borden MD, 500 mg at 04/12/22 2996    Examination:  /78   Pulse 106   Temp 97.7 °F (36.5 °C) (Oral)   Resp 18   SpO2 100%   Gait - steady    HOSPITAL COURSE[de-identified]  Following admission to the hospital, patient had a complete physical exam and blood work up  Patient was monitored closely with suicide precaution  Patient was started on meds as listed below  Was encouraged to participate in group and other milieu activity  Patient started to feel better with this combination of treatment. Significant progress in the symptoms since admission.     Mood better, with the score of 2/10 - bad  No AVH or paranoid thoughts  No Hopeless or worthless feeling  No active SI/HI  Appetite:  [x] Normal  [] Increased  [] Decreased    Sleep:       [x] Normal  [] Fair       [] Poor            Energy:    [x] Normal  [] Increased  [] Decreased     SI [] Present  [x] Absent  HI  []Present  [x] Absent   Aggression:  [] yes  [] no  Patient is [x] able  [] unable to CONTRACT FOR SAFETY   Medication side effects(SE):  [x] None(Psych. Meds.) [] Other    Pt agreed to take a mini break from school for few days, go back home and then return to college  Want to improve the relationship with his parents- think that they don't have a son-parent relationship- instead he believe that he is being analysed every statement that he makes    Mental Status Examination on discharge:    Level of consciousness:  within normal limits   Appearance:  well-appearing  Behavior/Motor:  no abnormalities noted  Attitude toward examiner:  attentive and good eye contact  Speech:  spontaneous, normal rate and normal volume   Mood: euthymic  Affect:  mood congruent  Thought processes:  goal directed   Thought content:  Suicidal Ideation:  denies suicidal ideation  Delusions:  no evidence of delusions  Perceptual Disturbance:  denies any perceptual disturbance  Cognition:  oriented to person, place, and time   Concentration intact  Memory intact  Insight good   Judgement fair   Fund of Knowledge adequate      ASSESSMENT:  Patient symptoms are:  [x] Well controlled  [x] Improving  [] Worsening  [] No change      Diagnosis:  Principal Problem:    Bipolar affective disorder, manic, severe, with psychotic behavior ([facility] [address]  Resolved Problems:    * No resolved hospital problems. *      LABS:    No results for input(s): WBC, HGB, PLT in the last 72 hours. No results for input(s): NA, K, CL, CO2, BUN, CREATININE, GLUCOSE in the last 72 hours. No results for input(s): BILITOT, ALKPHOS, AST, ALT in the last 72 hours.   Lab Results   Component Value Date    LABAMPH Neg 04/03/2022    BARBSCNU Neg 04/03/2022    LABBENZ Neg 04/03/2022    LABMETH Neg 04/03/2022    OPIATESCREENURINE Neg 04/03/2022    PHENCYCLIDINESCREENURINE Neg 04/03/2022    ETOH <10 04/03/2022     Lab Results   Component Value Date    TSH 2.590 04/03/2022     No results found for: LITHIUM  Lab Results   Component Value Date    VALPROATE 109.3 (H) 04/09/2022       RISK ASSESSMENT AT DISCHARGE: Low risk for suicide and homicide. Treatment Plan:  Reviewed current Medications with the patient. Education provided on the complaince with treatment. Risks, benefits, side effects, drug-to-drug interactions and alternatives to treatment were discussed. Encourage patient to attend outpatient follow up appointment and therapy. Patient was advised to call the outpatient provider, visit the nearest ED or call 911 if symptoms are not manageable. Patient's family member was contacted prior to the discharge.          Medication List      START taking these medications    paliperidone 6 MG extended release tablet  Commonly known as: INVEGA  Take 1 tablet by mouth daily  Start taking on: April 13, 2022     valproic acid 250 MG/5ML Soln oral solution  Commonly known as: DEPAKENE  Take 10 mLs by mouth 2 times daily        STOP taking these medications    clonazePAM 0.5 MG tablet  Commonly known as: KLONOPIN     sertraline 50 MG tablet  Commonly known as: ZOLOFT           Where to Get Your Medications      These medications were sent to [facility] [phone]  [address] [place] [address] Road    Phone: [phone]   · paliperidone 6 MG extended release tablet  · valproic acid 250 MG/5ML Soln oral solution           Reason for more than one antipsychotic:   [x] N/A  [] 3 failed monotherapy(drugs tried):  [] Cross over to a new antipsychotic  [] Taper to monotherapy from polypharmacy  [] Augmentation of Clozapine therapy due to treatment resistance to single therapy        TIME SPEND - 35 MINUTES TO COMPLETE THE EVALUATION, DISCHARGE SUMMARY, MEDICATION RECONCILIATION AND FOLLOW UP CARE     Signed:  Jack Liz MD  4/12/2022  9:34 AM

## 2022-04-12 NOTE — GROUP NOTE
Group Therapy Note    Date: 4/12/2022    Group Start Time: 1100  Group End Time: 1137  Group Topic: Psychotherapy    [facility]    Lazara Goode, [facility]        Group Therapy Note    Attendees:12         Patient's Goal: learn resources    Notes:  Patient would like one on one therapy provided     Status After Intervention:  Improved    Participation Level: Interactive    Participation Quality: Appropriate      Speech:  pressured      Thought Process/Content: Logical      Affective Functioning: Congruent      Mood: anxious      Level of consciousness:  Alert      Response to Learning: Progressing to goal      Endings: None Reported    Modes of Intervention: Support      Discipline Responsible: /Counselor      Signature:  Juan Miguel Kay, [facility]

## 2022-04-12 NOTE — DISCHARGE INSTR - DIET

## 2022-04-12 NOTE — CARE COORDINATION
Left vm with father requesting return call to discuss discharge plans.  Electronically signed by EMELINA Tran on 4/12/2022 at 9:30 AM

## 2022-04-12 NOTE — GROUP NOTE
Group Therapy Note    Date: 4/12/2022    Group Start Time: 1000  Group End Time: 1050  Group Topic: Psychoeducation    [facility]        Group Therapy Note    Attendees: 10         Patient's Goal:  \"To go home\"    Notes:  Patient attended the 1000 skills group. Patient was a little anxious but he was in control, he was sociable and he work fairly well on his task.     Status After Intervention:  Improved    Participation Level: Fairly well     Participation Quality: Appropriate      Speech:  talkative      Thought Process/Content: Linear      Affective Functioning: Improved      Mood: A little anxious      Level of consciousness:  Alert      Response to Learning: Progressing to goal      Endings: None Reported    Modes of Intervention: Education, Socialization and Activity      Discipline Responsible: Psychoeducational Specialist      Signature:  Kraig Gan

## 2022-04-12 NOTE — CARE COORDINATION
----- Message from Pan Juarez RN sent at 4/15/2019  9:09 AM EDT -----  Successful transmission received. Please call patient and give next appointment. Spoke with pt's father and discussed dc planning. Informed him of video meeting tomorrow at 1030-11am with mariela at [place] to discuss returning to school. Discussed picking pt up and going to [place] to pack then return to parents home in [place]. disussed pt stating he wants to go to his parents home snd has weighed the pros and cons of this decision. Discussed how pt is yearning for a parent child relationship versus therapist and patient relationship. Discussed medication compliance and pt monitoring medications. Discussed pt not driving until outpatient MD clears him. Informed him that pt is made aware of these discharge plans. Will be here around 3-330pm to  patient.  Electronically signed by EMELINA Kyle on 4/12/2022 at 11:16 AM

## 2022-05-17 ENCOUNTER — HOSPITAL ENCOUNTER (OUTPATIENT)
Dept: LAB | Age: 22
Discharge: HOME | End: 2022-05-17
Payer: COMMERCIAL

## 2022-05-17 LAB
ALBUMIN SERPL-MCNC: 4.5 G/DL (ref 3.5–4.6)
ALP BLD-CCNC: 83 U/L (ref 35–104)
ALT SERPL-CCNC: 10 U/L (ref 0–41)
ANION GAP SERPL CALCULATED.3IONS-SCNC: 13 MEQ/L (ref 9–15)
AST SERPL-CCNC: 17 U/L (ref 0–40)
BASOPHILS ABSOLUTE: 0.1 K/UL (ref 0–0.2)
BASOPHILS RELATIVE PERCENT: 1.2 %
BILIRUB SERPL-MCNC: 0.3 MG/DL (ref 0.2–0.7)
BUN BLDV-MCNC: 17 MG/DL (ref 6–20)
CALCIUM SERPL-MCNC: 9.1 MG/DL (ref 8.5–9.9)
CHLORIDE BLD-SCNC: 99 MEQ/L (ref 95–107)
CHOLESTEROL, TOTAL: 166 MG/DL (ref 0–199)
CO2: 27 MEQ/L (ref 20–31)
CREAT SERPL-MCNC: 0.54 MG/DL (ref 0.7–1.2)
EOSINOPHILS ABSOLUTE: 0.1 K/UL (ref 0–0.7)
EOSINOPHILS RELATIVE PERCENT: 2 %
GFR AFRICAN AMERICAN: >60
GFR NON-AFRICAN AMERICAN: >60
GLOBULIN: 2.2 G/DL (ref 2.3–3.5)
GLUCOSE BLD-MCNC: 92 MG/DL (ref 70–99)
HBA1C MFR BLD: 4.7 % (ref 4.8–5.9)
HCT VFR BLD CALC: 39.7 % (ref 42–52)
HDLC SERPL-MCNC: 92 MG/DL (ref 40–59)
HEMOGLOBIN: 13.4 G/DL (ref 14–18)
LDL CHOLESTEROL CALCULATED: 64 MG/DL (ref 0–129)
LYMPHOCYTES ABSOLUTE: 0.7 K/UL (ref 1–4.8)
LYMPHOCYTES RELATIVE PERCENT: 13.8 %
MCH RBC QN AUTO: 30.4 PG (ref 27–31.3)
MCHC RBC AUTO-ENTMCNC: 33.8 % (ref 33–37)
MCV RBC AUTO: 90 FL (ref 80–100)
MONOCYTES ABSOLUTE: 0.5 K/UL (ref 0.2–0.8)
MONOCYTES RELATIVE PERCENT: 9.8 %
NEUTROPHILS ABSOLUTE: 3.7 K/UL (ref 1.4–6.5)
NEUTROPHILS RELATIVE PERCENT: 73.2 %
PDW BLD-RTO: 14.7 % (ref 11.5–14.5)
PLATELET # BLD: 170 K/UL (ref 130–400)
POTASSIUM SERPL-SCNC: 4.3 MEQ/L (ref 3.4–4.9)
RBC # BLD: 4.41 M/UL (ref 4.7–6.1)
SODIUM BLD-SCNC: 139 MEQ/L (ref 135–144)
TOTAL PROTEIN: 6.7 G/DL (ref 6.3–8)
TRIGL SERPL-MCNC: 48 MG/DL (ref 0–150)
TSH SERPL DL<=0.05 MIU/L-ACNC: 3.33 UIU/ML (ref 0.44–3.86)
VALPROIC ACID LEVEL: 29.1 UG/ML (ref 50–100)
WBC # BLD: 5 K/UL (ref 4.8–10.8)

## 2022-05-17 PROCEDURE — 82306 VITAMIN D 25 HYDROXY: CPT

## 2022-05-17 PROCEDURE — 82607 VITAMIN B-12: CPT

## 2022-05-17 PROCEDURE — 80053 COMPREHEN METABOLIC PANEL: CPT

## 2022-05-17 PROCEDURE — 80061 LIPID PANEL: CPT

## 2022-05-17 PROCEDURE — 85025 COMPLETE CBC W/AUTO DIFF WBC: CPT

## 2022-05-17 PROCEDURE — 80164 ASSAY DIPROPYLACETIC ACD TOT: CPT

## 2022-05-17 PROCEDURE — 83036 HEMOGLOBIN GLYCOSYLATED A1C: CPT

## 2022-05-17 PROCEDURE — 84443 ASSAY THYROID STIM HORMONE: CPT

## 2022-05-17 PROCEDURE — 82746 ASSAY OF FOLIC ACID SERUM: CPT

## 2022-05-17 PROCEDURE — 36415 COLL VENOUS BLD VENIPUNCTURE: CPT

## 2022-05-18 LAB
FOLATE: 11.7 NG/ML
VITAMIN B-12: 323 PG/ML (ref 232–1245)
VITAMIN D 25-HYDROXY: 30.1 NG/ML

## 2022-06-20 NOTE — GROUP NOTE
Group Therapy Note    Date: 4/6/2022    Group Start Time: 2030  Group End Time: 2040  Group Topic: Wrap-Up    [facility]        Group Therapy Note    Attendees: 13/18         Patient's Goal:  \"to get out of here\"    Notes:  Patient reported not meeting their goal for the day. Patient shared enjoying socializing with peers today.     Status After Intervention:  Unchanged    Participation Level: Interactive    Participation Quality: Appropriate, Attentive and Sharing      Speech:  pressured      Thought Process/Content: Linear      Affective Functioning: Flat      Mood: euthymic      Level of consciousness:  Alert and Attentive      Response to Learning: Progressing to goal      Endings: None Reported    Modes of Intervention: Support      Discipline Responsible: Boticca      Signature:  Luis Mccord 9

## 2024-03-06 NOTE — GROUP NOTE
Group Therapy Note    Date: 4/8/2022    Group Start Time: 36  Group End Time: 1700  Group Topic: Healthy Living/Wellness    [facility]    Adrien Lazcano RN        Group Therapy Note    Attendees: 7/10         Patient's Goal:  Practice gratitude and discuss importance     Notes:  Alert and appropriate    Status After Intervention:  Unchanged    Participation Level: Interactive    Participation Quality: Appropriate and Attentive      Speech:  normal      Thought Process/Content: Logical  Linear      Affective Functioning: Congruent      Mood: euthymic      Level of consciousness:  Alert and Oriented x4      Response to Learning: Able to verbalize current knowledge/experience and Able to verbalize/acknowledge new learning      Endings: None Reported    Modes of Intervention: Education and Support      Discipline Responsible: Registered Nurse      Signature:  Adrien Lazcano RN Walker